# Patient Record
Sex: MALE | Race: WHITE | Employment: FULL TIME | URBAN - METROPOLITAN AREA
[De-identification: names, ages, dates, MRNs, and addresses within clinical notes are randomized per-mention and may not be internally consistent; named-entity substitution may affect disease eponyms.]

---

## 2017-03-24 ENCOUNTER — OFFICE VISIT (OUTPATIENT)
Dept: FAMILY MEDICINE CLINIC | Age: 42
End: 2017-03-24

## 2017-03-24 VITALS
SYSTOLIC BLOOD PRESSURE: 136 MMHG | HEART RATE: 64 BPM | TEMPERATURE: 98 F | WEIGHT: 195.6 LBS | BODY MASS INDEX: 22.63 KG/M2 | RESPIRATION RATE: 16 BRPM | DIASTOLIC BLOOD PRESSURE: 84 MMHG | HEIGHT: 78 IN

## 2017-03-24 DIAGNOSIS — M54.2 NECK PAIN, CHRONIC: ICD-10-CM

## 2017-03-24 DIAGNOSIS — J06.9 VIRAL URI: Primary | ICD-10-CM

## 2017-03-24 DIAGNOSIS — G89.29 NECK PAIN, CHRONIC: ICD-10-CM

## 2017-03-24 PROCEDURE — 99213 OFFICE O/P EST LOW 20 MIN: CPT | Performed by: INTERNAL MEDICINE

## 2017-03-24 ASSESSMENT — ENCOUNTER SYMPTOMS
GASTROINTESTINAL NEGATIVE: 1
RESPIRATORY NEGATIVE: 1
ALLERGIC/IMMUNOLOGIC NEGATIVE: 1

## 2018-03-09 ENCOUNTER — HOSPITAL ENCOUNTER (OUTPATIENT)
Dept: GENERAL RADIOLOGY | Age: 43
Discharge: HOME | End: 2018-03-09

## 2018-03-09 ENCOUNTER — OFFICE VISIT (OUTPATIENT)
Dept: FAMILY MEDICINE CLINIC | Age: 43
End: 2018-03-09

## 2018-03-09 ENCOUNTER — HOSPITAL ENCOUNTER (OUTPATIENT)
Dept: OTHER | Age: 43
Discharge: HOME | End: 2018-03-10
Attending: INTERNAL MEDICINE | Admitting: INTERNAL MEDICINE

## 2018-03-09 ENCOUNTER — HOSPITAL ENCOUNTER (OUTPATIENT)
Dept: GENERAL RADIOLOGY | Age: 43
Discharge: HOME | End: 2018-03-10

## 2018-03-09 VITALS
HEIGHT: 78 IN | DIASTOLIC BLOOD PRESSURE: 64 MMHG | HEART RATE: 55 BPM | RESPIRATION RATE: 18 BRPM | OXYGEN SATURATION: 96 % | WEIGHT: 198 LBS | BODY MASS INDEX: 22.91 KG/M2 | SYSTOLIC BLOOD PRESSURE: 118 MMHG

## 2018-03-09 DIAGNOSIS — M25.551 RIGHT HIP PAIN: ICD-10-CM

## 2018-03-09 DIAGNOSIS — M25.551 RIGHT HIP PAIN: Primary | ICD-10-CM

## 2018-03-09 DIAGNOSIS — G89.29 CHRONIC PAIN OF RIGHT KNEE: ICD-10-CM

## 2018-03-09 DIAGNOSIS — M25.561 CHRONIC PAIN OF RIGHT KNEE: ICD-10-CM

## 2018-03-09 PROCEDURE — 99213 OFFICE O/P EST LOW 20 MIN: CPT | Performed by: INTERNAL MEDICINE

## 2018-03-09 ASSESSMENT — ENCOUNTER SYMPTOMS
ALLERGIC/IMMUNOLOGIC NEGATIVE: 1
RESPIRATORY NEGATIVE: 1
GASTROINTESTINAL NEGATIVE: 1

## 2018-03-09 ASSESSMENT — PATIENT HEALTH QUESTIONNAIRE - PHQ9
SUM OF ALL RESPONSES TO PHQ9 QUESTIONS 1 & 2: 0
SUM OF ALL RESPONSES TO PHQ QUESTIONS 1-9: 0
1. LITTLE INTEREST OR PLEASURE IN DOING THINGS: 0
2. FEELING DOWN, DEPRESSED OR HOPELESS: 0

## 2018-03-09 NOTE — PROGRESS NOTES
Subjective:      Patient ID: Alex Leung is a 37 y.o. male. HPI  Chronic pain of right knee  Chronic R knee pain. Worse in am and after sleeping for a while. Sharp and decreased ROM while has pain. Right hip pain  Worse after sleeping on side. No palp Trochanter tendonitis. Past Medical History:   Diagnosis Date    Chronic back pain     djd     Current Outpatient Prescriptions   Medication Sig Dispense Refill    Glucosamine-Chondroitin 500-400 MG CAPS Take  by mouth.  multivitamin (THERAGRAN) per tablet Take 1 tablet by mouth daily.  Cholecalciferol (D3-1000) 1000 UNITS CAPS Take  by mouth. No current facility-administered medications for this visit. No Known Allergies  Social History   Substance Use Topics    Smoking status: Never Smoker    Smokeless tobacco: Never Used    Alcohol use Yes      Comment: occasional     Family History   Problem Relation Age of Onset    Stroke Maternal Grandmother     High Cholesterol Father     High Cholesterol Paternal Grandmother     Cancer Mother      skin       Review of Systems   Constitutional: Negative. Respiratory: Negative. Cardiovascular: Negative. Gastrointestinal: Negative. Genitourinary: Negative. Musculoskeletal: Negative. Allergic/Immunologic: Negative. Neurological: Negative. Hematological: Negative. Psychiatric/Behavioral: Negative. Vitals:    03/09/18 1452   BP: 118/64   Pulse: 55   Resp: 18   SpO2: 96%   Weight: 198 lb (89.8 kg)   Height: 6' 6\" (1.981 m)       Objective:   Physical Exam   Constitutional: He is oriented to person, place, and time. Vital signs are normal. He appears well-developed and well-nourished. No distress. HENT:   Head: Normocephalic and atraumatic. Eyes: Conjunctivae and EOM are normal. Pupils are equal, round, and reactive to light. Neck: Trachea normal, normal range of motion, full passive range of motion without pain and phonation normal. Neck supple.  Normal

## 2018-03-09 NOTE — ASSESSMENT & PLAN NOTE
Chronic R knee pain. Worse in am and after sleeping for a while. Sharp and decreased ROM while has pain.

## 2018-03-12 ENCOUNTER — TELEPHONE (OUTPATIENT)
Dept: FAMILY MEDICINE CLINIC | Age: 43
End: 2018-03-12

## 2018-03-12 NOTE — TELEPHONE ENCOUNTER
FYI- The patient called to check on the results of his hip Xray from 3/9/18. I read over the results with him and gave him the number for [facility]. He will contact them to set something up. He had no other questions and understood those results.

## 2018-03-28 ENCOUNTER — OFFICE VISIT (OUTPATIENT)
Dept: ORTHOPEDIC SURGERY | Age: 43
End: 2018-03-28

## 2018-03-28 VITALS
BODY MASS INDEX: 22.91 KG/M2 | SYSTOLIC BLOOD PRESSURE: 109 MMHG | HEIGHT: 78 IN | DIASTOLIC BLOOD PRESSURE: 72 MMHG | HEART RATE: 80 BPM | WEIGHT: 197.97 LBS

## 2018-03-28 DIAGNOSIS — M25.851 RIGHT HIP IMPINGEMENT SYNDROME: ICD-10-CM

## 2018-03-28 DIAGNOSIS — M25.561 PAIN IN JOINT OF RIGHT KNEE: Primary | ICD-10-CM

## 2018-03-28 DIAGNOSIS — M17.11 PRIMARY OSTEOARTHRITIS OF RIGHT KNEE: ICD-10-CM

## 2018-03-28 PROCEDURE — 99203 OFFICE O/P NEW LOW 30 MIN: CPT | Performed by: ORTHOPAEDIC SURGERY

## 2018-03-28 RX ORDER — DICLOFENAC SODIUM 75 MG/1
75 TABLET, DELAYED RELEASE ORAL 2 TIMES DAILY
Qty: 60 TABLET | Refills: 1 | Status: SHIPPED | OUTPATIENT
Start: 2018-03-28 | End: 2018-07-23 | Stop reason: ALTCHOICE

## 2018-03-28 NOTE — PROGRESS NOTES
lateral joint lines are nontender, no tenderness about the patella, full range of motion and ligamentously stable      Diagnostic Testing:      Views:  2   Location:  RIGHT knee   Findings:  Mild medial and patellofemoral joint line narrowing, early calcinosis of the medial compartment joint space. I also reviewed previous x-rays of the knee and hip. Unfortunately the patient has cam impingement of the RIGHT hip with degenerative changes. Arthritis is moderate. Orders     Orders Placed This Encounter   Procedures    XR KNEE RIGHT (1-2 VIEWS)     [id number]     Order Specific Question:   Reason for exam:     Answer:   Pain         Assessment / Treatment Plan:     1. RIGHT hip osteoarthritis with cam impingement. He also has moderate osteoarthritic change already in the hip with diminished range of motion. I think he is passed any sort of arthroscopic procedure. 2.  RIGHT knee pain, mild osteoarthritis, likely referred pain from the hip    I talked to the patient about the nature of this problem. Talked about treatment options including physical therapy, avoiding high-impact activity, glucosamine conjoined and anti-inflammatories. He understands he will likely ultimately require surgical fixation, likely total hip arthroplasty as his symptoms persist.  I do not think he is a candidate for any type of arthroscopic or resurfacing procedure. I have personally performed and/or participated in the history, exam and medical decision making and agree with all pertinent clinical information. I have also reviewed and agree with the past medical, family and social history unless otherwise noted. This dictation was performed with a verbal recognition program (DRAGON) and it was checked for errors. It is possible that there are still dictated errors within this office note. If so, please bring any errors to my attention for an addendum.  All efforts were made to ensure that this office note is

## 2018-07-23 ENCOUNTER — OFFICE VISIT (OUTPATIENT)
Dept: DERMATOLOGY | Age: 43
End: 2018-07-23

## 2018-07-23 DIAGNOSIS — D18.01 CHERRY ANGIOMA: ICD-10-CM

## 2018-07-23 DIAGNOSIS — Z12.83 SCREENING EXAM FOR SKIN CANCER: ICD-10-CM

## 2018-07-23 DIAGNOSIS — D22.9 MULTIPLE BENIGN NEVI: Primary | ICD-10-CM

## 2018-07-23 PROCEDURE — 99203 OFFICE O/P NEW LOW 30 MIN: CPT | Performed by: DERMATOLOGY

## 2018-07-23 NOTE — PROGRESS NOTES
[facility]) Dermatology  Jun Steel [address]      Lisa Monroe  1975    37 y.o. male     Date of Visit: 7/23/2018    Chief Complaint / Reason for Referral: Skin check     I was asked to see this patient by Dr. Brice ref. provider found. History of Present Illness:  1. Here for skin check. Several yr history of persistent increasing in number asymptomatic lesions on trunk. No assoc pain or bleeding.   -Denies personal history of skin cancer, atypical nevi, or melanoma. -(+) family hx skin cancer - unsure of type     2. Here for mole check. No new moles. No moles changing in size, shape, color. None associated w/ pain, bleeding, pruritus.    -Likes to spend time outdoors. Uses SPF 50 sunscreen and swim shirts regularly.   -Just returned from annual trip to [address] [place] [address] of Systems:  Constitutional: Reports general sense of well-being. Heme: Denies abnormal bleeding/bruising. Past Medical History, Surgical History, Family History, Medications and Allergies reviewed. Past Medical History:   Diagnosis Date    Chronic back pain     djd     Past Surgical History:   Procedure Laterality Date    KNEE SURGERY      right    SHOULDER SURGERY      right       No Known Allergies  Outpatient Prescriptions Marked as Taking for the 7/23/18 encounter (Office Visit) with Jun Steel MD   Medication Sig Dispense Refill    Glucosamine-Chondroitin 500-400 MG CAPS Take  by mouth.  multivitamin (THERAGRAN) per tablet Take 1 tablet by mouth daily.  Cholecalciferol (D3-1000) 1000 UNITS CAPS Take  by mouth. Social History: Wife and children are pts here     Physical Examination     [place] Skin Type 3    The following were examined and determined to be normal: Psych/Neuro, Scalp/hair, Head/face, Conjunctivae/eyelids, Gums/teeth/lips, Neck, RUE, LUE, RLE, LLE, Nails/digits and Genitalia/groin/buttocks.     The following were examined and determined to be abnormal:

## 2018-11-01 ENCOUNTER — OFFICE VISIT (OUTPATIENT)
Dept: FAMILY MEDICINE CLINIC | Age: 43
End: 2018-11-01
Payer: COMMERCIAL

## 2018-11-01 VITALS
BODY MASS INDEX: 23.51 KG/M2 | OXYGEN SATURATION: 99 % | SYSTOLIC BLOOD PRESSURE: 128 MMHG | HEIGHT: 78 IN | WEIGHT: 203.2 LBS | RESPIRATION RATE: 16 BRPM | DIASTOLIC BLOOD PRESSURE: 72 MMHG | HEART RATE: 86 BPM

## 2018-11-01 DIAGNOSIS — M25.551 RIGHT HIP PAIN: ICD-10-CM

## 2018-11-01 DIAGNOSIS — Z00.00 ANNUAL PHYSICAL EXAM: Primary | ICD-10-CM

## 2018-11-01 PROCEDURE — 99396 PREV VISIT EST AGE 40-64: CPT | Performed by: INTERNAL MEDICINE

## 2018-11-01 ASSESSMENT — ENCOUNTER SYMPTOMS
ALLERGIC/IMMUNOLOGIC NEGATIVE: 1
RESPIRATORY NEGATIVE: 1
GASTROINTESTINAL NEGATIVE: 1
EYES NEGATIVE: 1

## 2018-11-01 NOTE — PATIENT INSTRUCTIONS
All above problems reviewed and the found to be unchanged except for the following :     Annual Physical Exam. Claritin,Neti pot and Flonase for allergies and 1% Hydrocortisone cream and Selsun Blue for Seborrhea . Right Hip Pain. Exercises and Aleve as needed. To Ortho if worsens. Prostate: na  Colonoscopy: na  DEXA: na  Eye: 8/2018. Hearing: passed in office exam. Maybe some worse. Immunization: up to date.   MMSE: na  Get Up and Go: na  Tob: nonsmoker/never  ETOH: 2-3 drinks/week  Caffeine: moderate  Cardiac Risk Assessment: 1.4%  Living will: yes  Medical power of : yes

## 2019-06-20 ENCOUNTER — TELEPHONE (OUTPATIENT)
Dept: FAMILY MEDICINE CLINIC | Age: 44
End: 2019-06-20

## 2019-06-20 DIAGNOSIS — R79.89 ABNORMAL CBC: Primary | ICD-10-CM

## 2019-06-20 NOTE — TELEPHONE ENCOUNTER
Patient said he called on Monday and left a message for Dr. Nicola Valdez regarding appointment verses blood work only. I do not see an encounter. He was last seen in November last year for his physical to return in a year according to office notes. His daughter was diagnosed with Leukemia. He had a blood draw done and his white blood count was low. He would like to know if you would like to see him and do a CBC in the office, do blood work with a lab appointment only?

## 2019-06-21 ENCOUNTER — NURSE ONLY (OUTPATIENT)
Dept: FAMILY MEDICINE CLINIC | Age: 44
End: 2019-06-21
Payer: COMMERCIAL

## 2019-06-21 DIAGNOSIS — R79.89 ABNORMAL CBC: ICD-10-CM

## 2019-06-21 LAB
HCT VFR BLD CALC: 43.6 % (ref 40.5–52.5)
HEMOGLOBIN: 14.2 G/DL (ref 13.5–17.5)
MCH RBC QN AUTO: 29.1 PG (ref 26–34)
MCHC RBC AUTO-ENTMCNC: 32.7 G/DL (ref 31–36)
MCV RBC AUTO: 89.1 FL (ref 80–100)
PDW BLD-RTO: 13.7 % (ref 12.4–15.4)
PLATELET # BLD: 207 K/UL (ref 135–450)
PMV BLD AUTO: 9.5 FL (ref 5–10.5)
RBC # BLD: 4.89 M/UL (ref 4.2–5.9)
WBC # BLD: 5.6 K/UL (ref 4–11)

## 2019-06-21 PROCEDURE — 36415 COLL VENOUS BLD VENIPUNCTURE: CPT | Performed by: INTERNAL MEDICINE

## 2020-04-13 ENCOUNTER — TELEPHONE (OUTPATIENT)
Dept: FAMILY MEDICINE CLINIC | Age: 45
End: 2020-04-13

## 2020-04-13 ENCOUNTER — VIRTUAL VISIT (OUTPATIENT)
Dept: FAMILY MEDICINE CLINIC | Age: 45
End: 2020-04-13
Payer: COMMERCIAL

## 2020-04-13 VITALS — WEIGHT: 190 LBS | DIASTOLIC BLOOD PRESSURE: 70 MMHG | BODY MASS INDEX: 21.96 KG/M2 | SYSTOLIC BLOOD PRESSURE: 130 MMHG

## 2020-04-13 PROBLEM — F51.02 ADJUSTMENT INSOMNIA: Status: ACTIVE | Noted: 2020-04-13

## 2020-04-13 PROCEDURE — 99212 OFFICE O/P EST SF 10 MIN: CPT | Performed by: INTERNAL MEDICINE

## 2020-04-13 RX ORDER — ZOLPIDEM TARTRATE 10 MG/1
10 TABLET ORAL NIGHTLY PRN
Qty: 14 TABLET | Refills: 0 | Status: SHIPPED | OUTPATIENT
Start: 2020-04-13 | End: 2020-04-27

## 2020-04-13 ASSESSMENT — ENCOUNTER SYMPTOMS
RESPIRATORY NEGATIVE: 1
ALLERGIC/IMMUNOLOGIC NEGATIVE: 1
GASTROINTESTINAL NEGATIVE: 1

## 2020-04-13 ASSESSMENT — PATIENT HEALTH QUESTIONNAIRE - PHQ9
SUM OF ALL RESPONSES TO PHQ QUESTIONS 1-9: 2
SUM OF ALL RESPONSES TO PHQ QUESTIONS 1-9: 2
2. FEELING DOWN, DEPRESSED OR HOPELESS: 1
SUM OF ALL RESPONSES TO PHQ9 QUESTIONS 1 & 2: 2
1. LITTLE INTEREST OR PLEASURE IN DOING THINGS: 1

## 2020-04-13 NOTE — PROGRESS NOTES
formulation 10/08/2015, 10/18/2018    Influenza Virus Vaccine 10/18/2013, 10/09/2014    Influenza Whole 10/18/2013, 10/09/2014    Influenza, Quadv, IM, PF (6 mo and older Fluzone, Flulaval, Fluarix, and 3 yrs and older Afluria) 10/14/2019    Tdap (Boostrix, Adacel) 07/30/2012   ,   Health Maintenance   Topic Date Due    HIV screen  01/02/1990    Lipid screen  07/10/2017    DTaP/Tdap/Td vaccine (2 - Td) 07/30/2022    Flu vaccine  Completed    Hepatitis A vaccine  Aged Out    Hepatitis B vaccine  Aged Out    Hib vaccine  Aged Out    Meningococcal (ACWY) vaccine  Aged Out    Pneumococcal 0-64 years Vaccine  Aged Out       PHYSICAL EXAMINATION:  [ INSTRUCTIONS:  \"[x]\" Indicates a positive item  \"[]\" Indicates a negative item  -- DELETE ALL ITEMS NOT EXAMINED]  Vital Signs: (As obtained by patient/caregiver or practitioner observation)     04/13/20 1455   BP: 130/70   Weight: 190 lb (86.2 kg)          Constitutional: [x] Appears well-developed and well-nourished [x] No apparent distress      [] Abnormal-   Mental status  [x] Alert and awake  [x] Oriented to person/place/time [x]Able to follow commands      Eyes:  EOM    [x]  Normal  [] Abnormal-  Sclera  [x]  Normal  [] Abnormal -         Discharge [x]  None visible  [] Abnormal -    HENT:   [x] Normocephalic, atraumatic.   [] Abnormal   [x] Mouth/Throat: Mucous membranes are moist.     External Ears [x] Normal  [] Abnormal-     Neck: [x] No visualized mass     Pulmonary/Chest: [x] Respiratory effort normal.  [x] No visualized signs of difficulty breathing or respiratory distress        [] Abnormal-      Musculoskeletal:   [x] Normal gait with no signs of ataxia         [x] Normal range of motion of neck        [] Abnormal-       Neurological:        [x] No Facial Asymmetry (Cranial nerve 7 motor function) (limited exam to video visit)          [x] No gaze palsy        [] Abnormal-         Skin:        [x] No significant exanthematous lesions or discoloration noted on facial skin         [] Abnormal-            Psychiatric:       [x] Normal Affect [x] No Hallucinations        [] Abnormal-     Other pertinent observable physical exam findings-     ASSESSMENT/PLAN:  1. Adjustment insomnia  Father passed away 3 days ago and not sleeping    - zolpidem (AMBIEN) 10 MG tablet; Take 1 tablet by mouth nightly as needed for Sleep for up to 14 days. Dispense: 14 tablet; Refill: 0    Follow up as needed. Beth Granados is a 39 y.o. male being evaluated by a Virtual Visit (video visit) encounter to address concerns as mentioned above. A caregiver was present when appropriate. Due to this being a TeleHealth encounter (During OWIKT-51 public health emergency), evaluation of the following organ systems was limited: Vitals/Constitutional/EENT/Resp/CV/GI//MS/Neuro/Skin/Heme-Lymph-Imm. Pursuant to the emergency declaration under the [address] [place] [address] and the [employer] Act, this Virtual Visit was conducted with patient's (and/or legal guardian's) consent, to reduce the patient's risk of exposure to COVID-19 and provide necessary medical care. The patient (and/or legal guardian) has also been advised to contact this office for worsening conditions or problems, and seek emergency medical treatment and/or call 911 if deemed necessary. Services were provided through a video synchronous discussion virtually to substitute for in-person clinic visit. Patient and provider were located at their individual homes. --Cecil Armstrong MD on 4/13/2020 at 3:10 PM    An electronic signature was used to authenticate this note.

## 2020-04-13 NOTE — ASSESSMENT & PLAN NOTE
Father passed away 3 days ago. Not sleeping well. Just wants something short term for sleep. No depression. No anxiety. 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

## 2020-10-05 ENCOUNTER — OFFICE VISIT (OUTPATIENT)
Dept: FAMILY MEDICINE CLINIC | Age: 45
End: 2020-10-05
Payer: COMMERCIAL

## 2020-10-05 VITALS
BODY MASS INDEX: 22.52 KG/M2 | DIASTOLIC BLOOD PRESSURE: 70 MMHG | OXYGEN SATURATION: 98 % | WEIGHT: 194.6 LBS | HEART RATE: 58 BPM | SYSTOLIC BLOOD PRESSURE: 110 MMHG | HEIGHT: 78 IN | TEMPERATURE: 97.7 F | RESPIRATION RATE: 16 BRPM

## 2020-10-05 PROBLEM — S69.92XA INJURY OF FINGER OF LEFT HAND: Status: ACTIVE | Noted: 2020-10-05

## 2020-10-05 PROBLEM — M72.2 PLANTAR FASCIITIS: Status: ACTIVE | Noted: 2020-10-05

## 2020-10-05 PROBLEM — E78.00 PURE HYPERCHOLESTEROLEMIA: Status: ACTIVE | Noted: 2020-10-05

## 2020-10-05 LAB
ALBUMIN SERPL-MCNC: 4.6 G/DL (ref 3.4–5)
ALP BLD-CCNC: 56 U/L (ref 40–129)
ALT SERPL-CCNC: 27 U/L (ref 10–40)
ANION GAP SERPL CALCULATED.3IONS-SCNC: 12 MMOL/L (ref 3–16)
AST SERPL-CCNC: 26 U/L (ref 15–37)
BILIRUB SERPL-MCNC: 0.5 MG/DL (ref 0–1)
BILIRUBIN DIRECT: <0.2 MG/DL (ref 0–0.3)
BILIRUBIN, INDIRECT: NORMAL MG/DL (ref 0–1)
BUN BLDV-MCNC: 12 MG/DL (ref 7–20)
CALCIUM SERPL-MCNC: 9.9 MG/DL (ref 8.3–10.6)
CHLORIDE BLD-SCNC: 102 MMOL/L (ref 99–110)
CHOLESTEROL, TOTAL: 187 MG/DL (ref 0–199)
CO2: 28 MMOL/L (ref 21–32)
CREAT SERPL-MCNC: 1 MG/DL (ref 0.9–1.3)
GFR AFRICAN AMERICAN: >60
GFR NON-AFRICAN AMERICAN: >60
GLUCOSE BLD-MCNC: 89 MG/DL (ref 70–99)
HCT VFR BLD CALC: 44.6 % (ref 40.5–52.5)
HDLC SERPL-MCNC: 65 MG/DL (ref 40–60)
HEMOGLOBIN: 14.6 G/DL (ref 13.5–17.5)
LDL CHOLESTEROL CALCULATED: 108 MG/DL
MCH RBC QN AUTO: 29 PG (ref 26–34)
MCHC RBC AUTO-ENTMCNC: 32.8 G/DL (ref 31–36)
MCV RBC AUTO: 88.4 FL (ref 80–100)
PDW BLD-RTO: 13.1 % (ref 12.4–15.4)
PHOSPHORUS: 3.5 MG/DL (ref 2.5–4.9)
PLATELET # BLD: 196 K/UL (ref 135–450)
PMV BLD AUTO: 8.8 FL (ref 5–10.5)
POTASSIUM SERPL-SCNC: 4.6 MMOL/L (ref 3.5–5.1)
RBC # BLD: 5.04 M/UL (ref 4.2–5.9)
SODIUM BLD-SCNC: 142 MMOL/L (ref 136–145)
TOTAL PROTEIN: 7 G/DL (ref 6.4–8.2)
TRIGL SERPL-MCNC: 70 MG/DL (ref 0–150)
TSH SERPL DL<=0.05 MIU/L-ACNC: 0.98 UIU/ML (ref 0.27–4.2)
VLDLC SERPL CALC-MCNC: 14 MG/DL
WBC # BLD: 3.7 K/UL (ref 4–11)

## 2020-10-05 PROCEDURE — 99396 PREV VISIT EST AGE 40-64: CPT | Performed by: INTERNAL MEDICINE

## 2020-10-05 PROCEDURE — 36415 COLL VENOUS BLD VENIPUNCTURE: CPT | Performed by: INTERNAL MEDICINE

## 2020-10-05 ASSESSMENT — ENCOUNTER SYMPTOMS
RESPIRATORY NEGATIVE: 1
EYES NEGATIVE: 1
GASTROINTESTINAL NEGATIVE: 1
ALLERGIC/IMMUNOLOGIC NEGATIVE: 1

## 2020-10-05 NOTE — ASSESSMENT & PLAN NOTE
Prostate: na  Colonoscopy: na  DEXA: na  Eye: 9/2020  Hearing: passed in office exam. Maybe some worse. Immunization: Flu shot at work next week.   MMSE: na  Get Up and Go: na  Tob: nonsmoker/never  ETOH: 2-3 drinks/week  Caffeine: moderate  Cardiac Risk Assessment: 1.4%  Living will: yes  Medical power of : yes

## 2020-10-05 NOTE — PATIENT INSTRUCTIONS
ASSESSMENT/PLAN:  1. Annual physical exam  Flu shot next week at work. 2. Pure hypercholesterolemia  Will do labs. 3. Injury of finger of left hand, subsequent encounter  F/u w/ Dr Soto Drake. 4. Right hip pain  To Ortho if new c/o.     5. Adjustment insomnia  meds as needed, call if new c/o.     6. Plantar fasciitis  Ice and stretch, inserts. 7. Hearing Loss  Will do hearing test.     RTSM 1 yr for H&P    An  electronic signature was used to authenticate this note. --Kristin Wilson MD on 10/5/2020 at 8:34 AM          Prostate: na  Colonoscopy: na  DEXA: na  Eye: 9/2020  Hearing: passed in office exam. Maybe some worse. Immunization: Flu shot at work next week. MMSE: na  Get Up and Go: na  Tob: nonsmoker/never  ETOH: 2-3 drinks/week  Caffeine: moderate  Cardiac Risk Assessment: 1.4%  Living will: yes  Medical power of : yes    Patient Education        Plantar Fasciitis: Exercises  Introduction  Here are some examples of exercises for you to try. The exercises may be suggested for a condition or for rehabilitation. Start each exercise slowly. Ease off the exercises if you start to have pain. You will be told when to start these exercises and which ones will work best for you. How to do the exercises  Towel stretch   1. Sit with your legs extended and knees straight. 2. Place a towel around your foot just under the toes. 3. Hold each end of the towel in each hand, with your hands above your knees. 4. Pull back with the towel so that your foot stretches toward you. 5. Hold the position for at least 15 to 30 seconds. 6. Repeat 2 to 4 times a session, up to 5 sessions a day. Calf stretch   This exercise stretches the muscles at the back of the lower leg (the calf) and the Achilles tendon. Do this exercise 3 or 4 times a day, 5 days a week. 1. Stand facing a wall with your hands on the wall at about eye level. Put the leg you want to stretch about a step behind your other leg.   2. Keeping your back heel on the floor, bend your front knee until you feel a stretch in the back leg. 3. Hold the stretch for 15 to 30 seconds. Repeat 2 to 4 times. Plantar fascia and calf stretch   Stretching the plantar fascia and calf muscles can increase flexibility and decrease heel pain. You can do this exercise several times each day and before and after activity. 1. Stand on a step as shown above. Be sure to hold on to the banister. 2. Slowly let your heels down over the edge of the step as you relax your calf muscles. You should feel a gentle stretch across the bottom of your foot and up the back of your leg to your knee. 3. Hold the stretch about 15 to 30 seconds, and then tighten your calf muscle a little to bring your heel back up to the level of the step. Repeat 2 to 4 times. Towel curls   Make this exercise more challenging by placing a weighted object, such as a soup can, on the other end of the towel. 1. While sitting, place your foot on a towel on the floor and scrunch the towel toward you with your toes. 2. Then, also using your toes, push the towel away from you. [place] pickups   1. Put marbles on the floor next to a cup.  2. Using your toes, try to lift the marbles up from the floor and put them in the cup. Follow-up care is a key part of your treatment and safety. Be sure to make and go to all appointments, and call your doctor if you are having problems. It's also a good idea to know your test results and keep a list of the medicines you take. Where can you learn more? Go to https://PellePharmdemarDibbz.Trupanion. org and sign in to your Mila account. Enter B792 in the [facility] box to learn more about \"Plantar Fasciitis: Exercises. \"     If you do not have an account, please click on the \"Sign Up Now\" link. Current as of: March 2, 2020               Content Version: 12.5  © 1571-3077 Healthwise, Incorporated. Care instructions adapted under license by [facility] it. Cut off the top of the cup until a half-inch of ice shows. Hold onto the remaining paper to use the cup. Rub the ice in small circles over the area for 5 to 7 minutes. · Contrast baths, which alternate hot and cold water, can also help reduce swelling. But because heat alone may make pain and swelling worse, end a contrast bath with a soak in cold water. · Wear a night splint if your doctor suggests it. A night splint holds your foot with the toes pointed up and the foot and ankle at a 90-degree angle. This position gives the bottom of your foot a constant, gentle stretch. · Do simple exercises such as calf stretches and towel stretches 2 to 3 times each day, especially when you first get up in the morning. These can help the plantar fascia become more flexible. They also make the muscles that support your arch stronger. Hold these stretches for 15 to 30 seconds per stretch. Repeat 2 to 4 times. ? Stand about 1 foot from a wall. Place the palms of both hands against the wall at chest level. Lean forward against the wall, keeping one leg with the knee straight and heel on the ground while bending the knee of the other leg.  ? Sit down on the floor or a mat with your feet stretched in front of you. Roll up a towel lengthwise, and loop it over the ball of your foot. Holding the towel at both ends, gently pull the towel toward you to stretch your foot. · Wear shoes with good arch support. Athletic shoes or shoes with a well-cushioned sole are good choices. · Try heel cups or shoe inserts (orthotics) to help cushion your heel. You can buy these at many shoe stores. · Put on your shoes as soon as you get out of bed. Going barefoot or wearing slippers may make your pain worse. · Reach and stay at a good weight for your height. This puts less strain on your feet. When should you call for help?    Call your doctor now or seek immediate medical care if:  · You have heel pain with fever, redness, or warmth in your heel.  · You cannot put weight on the sore foot. Watch closely for changes in your health, and be sure to contact your doctor if:  · You have numbness or tingling in your heel. · Your heel pain lasts more than 2 weeks. Where can you learn more? Go to https://chpepiceweb.Pict. org and sign in to your Therasis account. Enter S699 in the [employer] box to learn more about \"Plantar Fasciitis: Care Instructions. \"     If you do not have an account, please click on the \"Sign Up Now\" link. Current as of: March 2, 2020               Content Version: 12.5  © 6894-0725 Healthwise, Incorporated. Care instructions adapted under license by [facility] ([facility]). If you have questions about a medical condition or this instruction, always ask your healthcare professional. [address] any warranty or liability for your use of this information.

## 2020-10-05 NOTE — ASSESSMENT & PLAN NOTE
L ring finger. Had ring caught on boat, skin injury and injured tendon end of L ring finger. Seeing [place]([facility]). Still in splint for 2-3 more weeks. Healing well.

## 2020-10-05 NOTE — ASSESSMENT & PLAN NOTE
Worse after sleeping on side. No palp Trochanter tendonitis. Found to have some imepingment and takes meds if needed. Has seen Ortho.

## 2020-10-05 NOTE — PROGRESS NOTES
°C)    SpO2: 98%    Weight: 194 lb 9.6 oz (88.3 kg)    Height: 6' 6\" (1.981 m)      Estimated body mass index is 22.49 kg/m² as calculated from the following:    Height as of this encounter: 6' 6\" (1.981 m). Weight as of this encounter: 194 lb 9.6 oz (88.3 kg). Physical Exam  Constitutional:       General: He is not in acute distress. Appearance: Normal appearance. He is well-developed and normal weight. He is not ill-appearing, toxic-appearing or diaphoretic. HENT:      Head: Normocephalic and atraumatic. Right Ear: Tympanic membrane, ear canal and external ear normal. Decreased hearing noted. There is no impacted cerumen. Left Ear: Tympanic membrane, ear canal and external ear normal. Decreased hearing noted. There is no impacted cerumen. Nose: Nose normal. No congestion or rhinorrhea. Right Sinus: No maxillary sinus tenderness or frontal sinus tenderness. Left Sinus: No maxillary sinus tenderness or frontal sinus tenderness. Mouth/Throat:      Mouth: Mucous membranes are moist.      Pharynx: Oropharynx is clear. Uvula midline. No oropharyngeal exudate or posterior oropharyngeal erythema. Eyes:      General: Lids are normal. No scleral icterus. Right eye: No discharge. Left eye: No discharge. Extraocular Movements: Extraocular movements intact. Conjunctiva/sclera: Conjunctivae normal.      Pupils: Pupils are equal, round, and reactive to light. Funduscopic exam:     Right eye: No hemorrhage, exudate, AV nicking, arteriolar narrowing or papilledema. Left eye: No hemorrhage, exudate, AV nicking, arteriolar narrowing or papilledema. Neck:      Musculoskeletal: Full passive range of motion without pain, normal range of motion and neck supple. No neck rigidity or muscular tenderness. Thyroid: No thyroid mass or thyromegaly. Vascular: Normal carotid pulses. No carotid bruit, hepatojugular reflux or JVD.       Trachea: Trachea normal. No tracheal deviation. Cardiovascular:      Rate and Rhythm: Normal rate and regular rhythm. No extrasystoles are present. Chest Wall: PMI is not displaced. Pulses: Normal pulses. No decreased pulses. Carotid pulses are 2+ on the right side and 2+ on the left side. Radial pulses are 2+ on the right side and 2+ on the left side. Femoral pulses are 2+ on the right side and 2+ on the left side. Popliteal pulses are 2+ on the right side and 2+ on the left side. Dorsalis pedis pulses are 2+ on the right side and 2+ on the left side. Posterior tibial pulses are 2+ on the right side and 2+ on the left side. Heart sounds: Normal heart sounds. No murmur. No friction rub. No gallop. Pulmonary:      Effort: Pulmonary effort is normal. No tachypnea, bradypnea, accessory muscle usage or respiratory distress. Breath sounds: Normal breath sounds. No stridor. No decreased breath sounds, wheezing, rhonchi or rales. Chest:      Chest wall: No tenderness. Abdominal:      General: Bowel sounds are normal. There is no distension or abdominal bruit. Palpations: Abdomen is soft. There is no shifting dullness, fluid wave, mass or pulsatile mass. Tenderness: There is no abdominal tenderness. There is no right CVA tenderness, left CVA tenderness, guarding or rebound. Hernia: No hernia is present. There is no hernia in the ventral area or left inguinal area. Genitourinary:     Penis: Normal. No tenderness. Scrotum/Testes: Normal. Cremasteric reflex is present. Prostate: Normal.      Rectum: Normal. Guaiac result negative. Musculoskeletal: Normal range of motion. General: Tenderness (PAin R hip and R lateral Epicondylitis. ) and signs of injury (L Ring finger ) present. No swelling or deformity. Right lower leg: No edema. Left lower leg: No edema.    Lymphadenopathy:      Head:      Right side of head: No submental, submandibular, tonsillar, preauricular, posterior auricular or occipital adenopathy. Left side of head: No submental, submandibular, tonsillar, preauricular, posterior auricular or occipital adenopathy. Cervical: No cervical adenopathy. Upper Body:      Right upper body: No supraclavicular or epitrochlear adenopathy. Left upper body: No supraclavicular or epitrochlear adenopathy. Skin:     General: Skin is warm and dry. Capillary Refill: Capillary refill takes less than 2 seconds. Coloration: Skin is not jaundiced or pale. Findings: No abrasion, bruising, erythema, lesion or rash. Nails: There is no clubbing. Neurological:      General: No focal deficit present. Mental Status: He is alert and oriented to person, place, and time. Mental status is at baseline. He is not disoriented. Cranial Nerves: No cranial nerve deficit. Sensory: No sensory deficit. Motor: No weakness, tremor, atrophy, abnormal muscle tone or seizure activity. Coordination: Coordination normal.      Gait: Gait normal.      Deep Tendon Reflexes: Reflexes are normal and symmetric. Reflexes normal. Babinski sign absent on the right side. Babinski sign absent on the left side. Reflex Scores:       Tricep reflexes are 2+ on the right side and 2+ on the left side. Bicep reflexes are 2+ on the right side and 2+ on the left side. Brachioradialis reflexes are 2+ on the right side and 2+ on the left side. Patellar reflexes are 2+ on the right side and 2+ on the left side. Achilles reflexes are 2+ on the right side and 2+ on the left side. Psychiatric:         Mood and Affect: Mood normal.         Speech: Speech normal.         Behavior: Behavior normal.         Thought Content: Thought content normal.         Judgment: Judgment normal.         ASSESSMENT/PLAN:  1. Annual physical exam  Flu shot next week at work.     2. Pure hypercholesterolemia  Will do labs.    3. Injury of finger of left hand, subsequent encounter  F/u w/ Dr Zackary Drew. 4. Right hip pain  To Ortho if new c/o.     5. Adjustment insomnia  meds as needed, call if new c/o.     6. Plantar fasciitis  Ice and stretch, inserts. 7. Hearing Loss  Will do hearing test.     RTSM 1 yr for H&P    An  electronic signature was used to authenticate this note.     --Raul Valenzuela MD on 10/5/2020 at 8:34 AM

## 2020-10-11 NOTE — PROGRESS NOTES
Chas Li   1975, 39 y.o. male   [id number]       Referring Provider: Rios Farmer MD   Referral Type: In an order in [address] [place] [address]    Reason for Visit: Evaluation of suspected change in hearing and tinnitus. ADULT AUDIOLOGIC EVALUATION      Chas Li is a 39 y.o. male seen today, 10/16/2020 , for an initial audiologic evaluation. AUDIOLOGIC AND OTHER PERTINENT MEDICAL HISTORY:      Chas Li noted a perceived gradual decline in hearing, bilaterally as he reports increased difficulty hearing details of conversation especially in background noise. Patient reports intermittent, non-bothersome tinnitus, bilaterally. He also notes family history of age-related hearing loss. No additional significant otologic or medical history was reported. Chas Li denied otalgia, aural fullness, otorrhea, dizziness, imbalance, history of falls, history of occupational/recreational noise exposure, history of head trauma and history of ear surgery. Date: 10/16/2020     IMPRESSIONS:      Today's results revealed hearing within normal limits and excellent word recognition, bilaterally. Discussed use of good communication strategies to assist with conversation. ASSESSMENT AND FINDINGS:     Otoscopy revealed: Clear ear canals bilaterally    RIGHT EAR:  Hearing Sensitivity:Within normal limits through 2kHz sloping to a mild to moderate sensorineural hearing loss through 8kHz. Speech Recognition Threshold: 15 dB HL  Word Recognition: Excellent (100%), based on NU-6 25-word list at 50 dBHL using recorded speech stimuli. Tympanometry: Normal peak pressure and compliance, Type A tympanogram, consistent with normal middle ear function. LEFT EAR:  Hearing Sensitivity: Within normal limits through 2kHz sloping to a mild to moderate sensorineural hearing loss through [facility]. Speech Recognition Threshold: 10 dB HL  Word Recognition: Excellent (96%), based on NU-6 25-word list at 50 dBHL using recorded speech stimuli. Tympanometry: Normal peak pressure and compliance, Type A tympanogram, consistent with normal middle ear function. Reliability: Good  Transducer: Inserts    See scanned audiogram dated 10/16/2020  for results. PATIENT EDUCATION:       The following items were discussed with the patient:    - Good Communication Strategies    - Tinnitus Management Strategies    Educational information was shared in the After Visit Summary. RECOMMENDATIONS:                                                                                                                                                                                                                                                          The following items are recommended based on patient report and results from today's appointment:   - Continue medical follow-up with Ana Moon MD .   - Retest hearing as medically indicated and/or sooner if a change in hearing is noted. - Utilize \"Good Communication Strategies\" as discussed to assist in speech understanding with communication partners. - Maintain a sound enriched environment to assist in the management of tinnitus symptoms.          Kristian Campos  Audiologist    Chart CC'd to: Ana Moon MD       Degree of   Hearing Sensitivity dB Range   Within Normal Limits (WNL) 0 - 20   Mild 20 - 40   Moderate 40 - 55   Moderately-Severe 55 - 70   Severe 70 - 90   Profound 90 +

## 2020-10-16 ENCOUNTER — PROCEDURE VISIT (OUTPATIENT)
Dept: AUDIOLOGY | Age: 45
End: 2020-10-16
Payer: COMMERCIAL

## 2020-10-16 VITALS — TEMPERATURE: 97 F

## 2020-10-16 PROBLEM — H90.3 SENSORINEURAL HEARING LOSS, BILATERAL: Status: ACTIVE | Noted: 2020-10-16

## 2020-10-16 PROCEDURE — 92567 TYMPANOMETRY: CPT | Performed by: AUDIOLOGIST

## 2020-10-16 PROCEDURE — 92557 COMPREHENSIVE HEARING TEST: CPT | Performed by: AUDIOLOGIST

## 2020-10-16 NOTE — Clinical Note
Dr. Jarred Kan,    Thank you for your referral for audiometric testing on this patient. Today's results revealed hearing within normal limits and excellent word recognition, bilaterally. Discussed use of good communication strategies to assist with conversation. Please see the scanned audiogram (under \"Media\" tab) and encounter note for details. If you have any questions, or if there is anything else you need, please let me know.       Kristian Thompson  Audiologist  ---  [facility] - [facility] - Audiology

## 2020-10-16 NOTE — PATIENT INSTRUCTIONS
Good Communication Strategies    Communication can be challenging for anyone, but can be especially difficult for those with some degree of hearing loss. While we may not be able to control every factor that may lead to difficulty with communication, there are Good Communication Strategies that we can all use in our day-to-day lives. Communication takes both parties working together for it to be successful. Tips as a Listener:   1. Control your environment. It is important to limit the amount of background noise in the room when possible. You should also consider having a good light source in the room to best see the other person. 2. Ask for clarification. Instead of saying \"What?\", you can use parts of what you heard to make a new question. For example, if you heard the word \"Thursday\" but not the rest of the week, you may ask \"What was that about Thursday? \" or \"What did you want to do Thursday? \". This shows the person talking that you are listening and will help them better explain what they are saying. 3. Be an advocate for yourself. If you are hearing but not understanding, tell the other person \"I can hear you, but I need you to slow down when you speak. \"  Or if someone is facing the other direction, say \"I cannot hear you when you are not looking at me when we talk. \"       Tips as a Talker:   - Sit or stand 3 to 6 feet away to maximize audibility         -- It is unrealistic to believe someone else will fully hear your message if you are speaking from across the room or in a different room in the house   - Stay at eye level to help with visual cues   - Make sure you have the persons attention before speaking   - Use facial expressions and gestures to accentuate your message   - Raise your voice slightly (do not scream)   - Speak slowly and distinctly   - Use short, simple sentences   - Rephrase your words if the person is having a hard time understanding you    - To avoid distortion, dont speak directly into a persons ear      Some additional items that may be helpful:   - Remain patient - this is important for both parties   - Write down items that still cannot be heard/understood. You may write with pen/paper or consider typing/texting on a cell phone or smart device. - If background noise is unavoidable, try to keep yourself in a good position in the room. By sitting at a renteria on the side of the restaurant (preferably a corner), it will be easier to communicate than if you were sitting at a table in the middle with background noise surrounding you. Keep yourself positioned away from music speakers or heavy foot traffic. Tinnitus: Overview and Management Strategies          Many people have some ringing sounds in their ears once in a while. You may hear a roar, a hiss, a tinkle, or a buzz. The sound usually lasts only a few minutes. If it goes on all the time, you may have tinnitus. Tinnitus is usually caused by long-term exposure to loud noise. This damages the nerves in the inner ear. It can occur with all types of hearing loss. It may be a symptom of almost any ear problem. Tinnitus may be caused by a buildup of earwax. Or, it may be caused by ear infections or certain medicines (especially antibiotics or large amounts of aspirin). You can also hear noises in your ears because of an injury to the ears, drinking too much alcohol or caffeine, or a medical condition. Other conditions may also contribute to tinnitus, including: head and neck trauma, temporomandibular joint disorder (TMJ), sinus pressure and barometric trauma, traumatic brain injury, metabolic disorders, autoimmune disorders, stress, and high blood pressure. You may need tests to evaluate your hearing and to find causes of long-lasting tinnitus. Your doctor may suggest one or more treatments to help you cope with the tinnitus. You can also do things at home to help reduce symptoms.     Follow-up care is a key part of your treatment and safety. Be sure to make and go to all appointments, and call your doctor if you are having problems. It's also a good idea to know your test results and keep a list of the medicines you take. How can you care for yourself at home? · Limit or cut out alcohol, caffeine, and sodium. They can make your symptoms worse. · Do not smoke or use other tobacco products. Nicotine reduces blood flow to the ear and makes tinnitus worse. If you need help quitting, talk to your doctor about stop-smoking programs and medicines. These can increase your chances of quitting for good. · Talk to your doctor about whether to stop taking aspirin and similar products such as ibuprofen or naproxen. · Get exercise often. It can help improve blood flow to the ear. Ways to manage/cope with tinnitus  Some tinnitus may last a long time. To manage your tinnitus, try to:  · Avoid noises that you think caused your tinnitus. If you can't avoid loud noises, wear earplugs or earmuffs. · Ignore the sound by paying attention to other things. Keeping your brain busy with other tasks or background noise can help your brain not focus on the tinnitus. · Try to not give the tinnitus an emotional reaction. Do your best to ignore the sound and not let it bother you. Relax using biofeedback, meditation, or yoga. Feeling stressed and being tired can make tinnitus worse. · Play music or white noise to help you sleep. Background noise may cover up the noise that you hear in your ears. You can buy a tabletop machine or a device that sits under your pillow to play soothing sounds, like ocean waves. · Smart phones have free apps, such as Whist, Relax Melodies, ReSound Relief, and White Noise Lite. These apps have different types of sounds/noise, some of which you can blend together to find sounds that are most soothing to you.   · Hearing aid technology, especially when there is some hearing loss, may help reduce tinnitus symptoms by giving your brain better access to the sounds it is missing. There are some hearing aids with built-in noise generator programs, which may help when amplification alone is not enough. Additional resources may be found through the [employer] at www.elvira.org    When should you call for help? Call 911 anytime you think you may need emergency care. For example, call if:    · You have symptoms of a stroke. These may include:  ? Sudden numbness, tingling, weakness, or loss of movement in your face, arm, or leg, especially on only one side of your body. ? Sudden vision changes. ? Sudden trouble speaking. ? Sudden confusion or trouble understanding simple statements. ? Sudden problems with walking or balance. ? A sudden, severe headache that is different from past headaches. Call your doctor now or seek immediate medical care if:    · You develop other symptoms. These may include hearing loss (or worse hearing loss), balance problems, dizziness, nausea, or vomiting. Watch closely for changes in your health, and be sure to contact your doctor if:    · Your tinnitus moves from both ears to one ear. · Your hearing loss gets worse within 1 day after an ear injury. · Your tinnitus or hearing loss does not get better within 1 week after an ear injury. · Your tinnitus bothers you enough that you want to take medicines to help you cope with it. If you notice changes in your tinnitus and/or your hearing, it is recommended that you have your hearing tested by your audiologist and to follow-up with your physician that manages your hearing loss (such as your ENT or Primary Care doctor).

## 2020-11-12 ENCOUNTER — TELEPHONE (OUTPATIENT)
Dept: FAMILY MEDICINE CLINIC | Age: 45
End: 2020-11-12

## 2020-11-12 NOTE — TELEPHONE ENCOUNTER
Patient calling to let Dr Jeferson Sanford know he got his flu shot at work on 10- at work so it could be put in my chart

## 2021-10-07 ENCOUNTER — OFFICE VISIT (OUTPATIENT)
Dept: FAMILY MEDICINE CLINIC | Age: 46
End: 2021-10-07
Payer: COMMERCIAL

## 2021-10-07 VITALS
WEIGHT: 194.6 LBS | HEIGHT: 78 IN | OXYGEN SATURATION: 98 % | RESPIRATION RATE: 16 BRPM | DIASTOLIC BLOOD PRESSURE: 74 MMHG | SYSTOLIC BLOOD PRESSURE: 118 MMHG | HEART RATE: 61 BPM | BODY MASS INDEX: 22.52 KG/M2

## 2021-10-07 DIAGNOSIS — M25.551 RIGHT HIP PAIN: ICD-10-CM

## 2021-10-07 DIAGNOSIS — S69.92XS INJURY OF FINGER OF LEFT HAND, SEQUELA: ICD-10-CM

## 2021-10-07 DIAGNOSIS — Z00.00 ANNUAL PHYSICAL EXAM: Primary | ICD-10-CM

## 2021-10-07 DIAGNOSIS — F51.02 ADJUSTMENT INSOMNIA: ICD-10-CM

## 2021-10-07 DIAGNOSIS — R53.83 FATIGUE, UNSPECIFIED TYPE: ICD-10-CM

## 2021-10-07 DIAGNOSIS — M72.2 PLANTAR FASCIITIS: ICD-10-CM

## 2021-10-07 DIAGNOSIS — Z11.4 SCREENING FOR HIV WITHOUT PRESENCE OF RISK FACTORS: ICD-10-CM

## 2021-10-07 DIAGNOSIS — Z11.59 NEED FOR HEPATITIS C SCREENING TEST: ICD-10-CM

## 2021-10-07 DIAGNOSIS — G89.29 NECK PAIN, CHRONIC: ICD-10-CM

## 2021-10-07 DIAGNOSIS — M54.2 NECK PAIN, CHRONIC: ICD-10-CM

## 2021-10-07 DIAGNOSIS — Z12.11 SCREEN FOR COLON CANCER: ICD-10-CM

## 2021-10-07 DIAGNOSIS — E78.00 PURE HYPERCHOLESTEROLEMIA: ICD-10-CM

## 2021-10-07 PROBLEM — J06.9 VIRAL URI: Status: RESOLVED | Noted: 2017-03-24 | Resolved: 2021-10-07

## 2021-10-07 LAB
ALBUMIN SERPL-MCNC: 4.6 G/DL (ref 3.4–5)
ALP BLD-CCNC: 64 U/L (ref 40–129)
ALT SERPL-CCNC: 24 U/L (ref 10–40)
ANION GAP SERPL CALCULATED.3IONS-SCNC: 13 MMOL/L (ref 3–16)
AST SERPL-CCNC: 23 U/L (ref 15–37)
BILIRUB SERPL-MCNC: 0.5 MG/DL (ref 0–1)
BILIRUBIN DIRECT: <0.2 MG/DL (ref 0–0.3)
BILIRUBIN, INDIRECT: NORMAL MG/DL (ref 0–1)
BUN BLDV-MCNC: 18 MG/DL (ref 7–20)
CALCIUM SERPL-MCNC: 9.7 MG/DL (ref 8.3–10.6)
CHLORIDE BLD-SCNC: 103 MMOL/L (ref 99–110)
CHOLESTEROL, TOTAL: 187 MG/DL (ref 0–199)
CO2: 26 MMOL/L (ref 21–32)
CREAT SERPL-MCNC: 1 MG/DL (ref 0.9–1.3)
GFR AFRICAN AMERICAN: >60
GFR NON-AFRICAN AMERICAN: >60
GLUCOSE BLD-MCNC: 80 MG/DL (ref 70–99)
HCT VFR BLD CALC: 45.5 % (ref 40.5–52.5)
HDLC SERPL-MCNC: 58 MG/DL (ref 40–60)
HEMOGLOBIN: 14.8 G/DL (ref 13.5–17.5)
HEPATITIS C ANTIBODY INTERPRETATION: NORMAL
LDL CHOLESTEROL CALCULATED: 120 MG/DL
MCH RBC QN AUTO: 28.5 PG (ref 26–34)
MCHC RBC AUTO-ENTMCNC: 32.5 G/DL (ref 31–36)
MCV RBC AUTO: 87.8 FL (ref 80–100)
PDW BLD-RTO: 13.2 % (ref 12.4–15.4)
PHOSPHORUS: 2.7 MG/DL (ref 2.5–4.9)
PLATELET # BLD: 227 K/UL (ref 135–450)
PMV BLD AUTO: 9.3 FL (ref 5–10.5)
POTASSIUM SERPL-SCNC: 4.9 MMOL/L (ref 3.5–5.1)
RBC # BLD: 5.18 M/UL (ref 4.2–5.9)
SODIUM BLD-SCNC: 142 MMOL/L (ref 136–145)
TOTAL PROTEIN: 7.3 G/DL (ref 6.4–8.2)
TRIGL SERPL-MCNC: 44 MG/DL (ref 0–150)
TSH SERPL DL<=0.05 MIU/L-ACNC: 0.84 UIU/ML (ref 0.27–4.2)
VLDLC SERPL CALC-MCNC: 9 MG/DL
WBC # BLD: 3.9 K/UL (ref 4–11)

## 2021-10-07 PROCEDURE — 99396 PREV VISIT EST AGE 40-64: CPT | Performed by: INTERNAL MEDICINE

## 2021-10-07 PROCEDURE — 36415 COLL VENOUS BLD VENIPUNCTURE: CPT | Performed by: INTERNAL MEDICINE

## 2021-10-07 ASSESSMENT — PATIENT HEALTH QUESTIONNAIRE - PHQ9
SUM OF ALL RESPONSES TO PHQ QUESTIONS 1-9: 0
2. FEELING DOWN, DEPRESSED OR HOPELESS: 0
SUM OF ALL RESPONSES TO PHQ QUESTIONS 1-9: 0
SUM OF ALL RESPONSES TO PHQ9 QUESTIONS 1 & 2: 0
1. LITTLE INTEREST OR PLEASURE IN DOING THINGS: 0
SUM OF ALL RESPONSES TO PHQ QUESTIONS 1-9: 0

## 2021-10-07 ASSESSMENT — ENCOUNTER SYMPTOMS
RESPIRATORY NEGATIVE: 1
GASTROINTESTINAL NEGATIVE: 1
EYES NEGATIVE: 1
ALLERGIC/IMMUNOLOGIC NEGATIVE: 1

## 2021-10-07 NOTE — PATIENT INSTRUCTIONS
A/P:     Annual Physical Exam. Colonoscopy soon. Pure Hypercholesterolemia. Will do labs and call if further w/u or treatment required. Injury of Finger of Left Hand. F/u w/ [facility]And MD if new c/o. Plantar Fasciitis. Ice and stretch, to Foot MD if no improvement. Adjustment Insomnia. Much improved. No meds required. Right Hip Pain. Chronic, sees ORtho. To ORtho if new c/o. Neck Pain, Chronic. Stretch and ice. Call if new c/o. Shanon Og MD         Prostate: na  Colonoscopy: na  DEXA: na  Eye: 9/2021  Hearing: passed in office exam. Recent hearing test showed minimal change. Immunization: up to date. MMSE: na  Get Up and Go: na  Tob: nonsmoker/never  ETOH: 2-3 drinks/week  Caffeine: moderate  Cardiac Risk Assessment: labs pending.   Living will: yes  Medical power of : yes

## 2021-10-07 NOTE — ASSESSMENT & PLAN NOTE
Worse after sleeping on side. No palp Trochanter tendonitis. Found to have some imepingment and takes meds if needed. Has seen Ortho. OA and may need THR in ~10 yrs per Ortho.

## 2021-10-07 NOTE — ASSESSMENT & PLAN NOTE
Prostate: na  Colonoscopy: na  DEXA: na  Eye: 9/2021  Hearing: passed in office exam. Recent hearing test showed minimal change. Immunization: up to date. MMSE: na  Get Up and Go: na  Tob: nonsmoker/never  ETOH: 2-3 drinks/week  Caffeine: moderate  Cardiac Risk Assessment: labs pending.   Living will: yes  Medical power of : yes

## 2021-10-07 NOTE — ASSESSMENT & PLAN NOTE
1 yr of pain bilaterally. Walking around home and got worse. Using ice. Some better. Saw Foot MD. Has had a cortisone injection that helped.

## 2021-10-07 NOTE — ASSESSMENT & PLAN NOTE
L ring finger. Had ring caught on boat, skin injury and injured tendon end of L ring finger. Seeing Counce(Foad). Was in splint for 2-3 weeks. Healing well.

## 2021-10-07 NOTE — PROGRESS NOTES
Talisha Bunn presents today for   Chief Complaint   Patient presents with    Annual Exam        Annual physical exam  Prostate: na  Colonoscopy: na  DEXA: na  Eye: 9/2021  Hearing: passed in office exam. Recent hearing test showed minimal change. Immunization: up to date. MMSE: na  Get Up and Go: na  Tob: nonsmoker/never  ETOH: 2-3 drinks/week  Caffeine: moderate  Cardiac Risk Assessment: labs pending. Living will: yes  Medical power of : yes    Pure hypercholesterolemia  Elevated LDL in past.     Right hip pain  Worse after sleeping on side. No palp Trochanter tendonitis. Found to have some imepingment and takes meds if needed. Has seen Ortho. OA and may need THR in ~10 yrs per Ortho. Neck pain, chronic   Chronic mild, stretch. Plantar fasciitis   1 yr of pain bilaterally. Walking around home and got worse. Using ice. Some better. Saw Foot MD. Has had a cortisone injection that helped. Injury of finger of left hand  L ring finger. Had ring caught on boat, skin injury and injured tendon end of L ring finger. Seeing [place]). Was in splint for 2-3 weeks. Healing well. Adjustment insomnia  Sleeping better, takes meds rarely. Less stress at present. Review of Systems   Constitutional: Positive for fatigue. HENT: Negative. Eyes: Negative. Respiratory: Negative. Cardiovascular: Negative. Gastrointestinal: Negative. Endocrine: Negative. Genitourinary: Negative. Musculoskeletal: Positive for arthralgias, gait problem and myalgias. Skin: Negative. Allergic/Immunologic: Negative. Hematological: Negative. Psychiatric/Behavioral: Negative. Physical Exam  Constitutional:       General: He is not in acute distress. Appearance: Normal appearance. He is well-developed and normal weight. He is not ill-appearing, toxic-appearing or diaphoretic. HENT:      Head: Normocephalic and atraumatic.       Right Ear: Hearing, tympanic membrane, ear canal and external ear normal.      Left Ear: Hearing, tympanic membrane, ear canal and external ear normal.      Nose: Nose normal.      Right Sinus: No maxillary sinus tenderness or frontal sinus tenderness. Left Sinus: No maxillary sinus tenderness or frontal sinus tenderness. Mouth/Throat:      Pharynx: Uvula midline. No oropharyngeal exudate. Eyes:      General: Lids are normal. No scleral icterus. Right eye: No discharge. Left eye: No discharge. Conjunctiva/sclera: Conjunctivae normal.      Pupils: Pupils are equal, round, and reactive to light. Funduscopic exam:     Right eye: No hemorrhage, exudate, AV nicking, arteriolar narrowing or papilledema. Left eye: No hemorrhage, exudate, AV nicking, arteriolar narrowing or papilledema. Neck:      Thyroid: No thyroid mass or thyromegaly. Vascular: Normal carotid pulses. No carotid bruit, hepatojugular reflux or JVD. Trachea: Trachea normal. No tracheal deviation. Cardiovascular:      Rate and Rhythm: Normal rate and regular rhythm. No extrasystoles are present. Chest Wall: PMI is not displaced. Pulses: Normal pulses. No decreased pulses. Carotid pulses are 2+ on the right side and 2+ on the left side. Radial pulses are 2+ on the right side and 2+ on the left side. Femoral pulses are 2+ on the right side and 2+ on the left side. Popliteal pulses are 2+ on the right side and 2+ on the left side. Dorsalis pedis pulses are 2+ on the right side and 2+ on the left side. Posterior tibial pulses are 2+ on the right side and 2+ on the left side. Heart sounds: Normal heart sounds. No murmur heard. No friction rub. No gallop. Pulmonary:      Effort: Pulmonary effort is normal. No tachypnea, bradypnea, accessory muscle usage or respiratory distress. Breath sounds: Normal breath sounds. No stridor. No decreased breath sounds, wheezing, rhonchi or rales.    Chest: Chest wall: No tenderness. Abdominal:      General: Bowel sounds are normal. There is no distension or abdominal bruit. Palpations: Abdomen is soft. There is no shifting dullness, fluid wave, mass or pulsatile mass. Tenderness: There is no abdominal tenderness. There is no guarding or rebound. Hernia: No hernia is present. There is no hernia in the ventral area or left inguinal area. Genitourinary:     Penis: Normal. No tenderness. Testes: Normal. Cremasteric reflex is present. Musculoskeletal:         General: No swelling, tenderness, deformity or signs of injury. Normal range of motion. Cervical back: Full passive range of motion without pain, normal range of motion and neck supple. No rigidity or tenderness. Right lower leg: No edema. Left lower leg: No edema. Comments: S/p R shoulder surgery. OA R hip. Palp Plantar pain bilat. Lymphadenopathy:      Head:      Right side of head: No submental, submandibular, tonsillar, preauricular, posterior auricular or occipital adenopathy. Left side of head: No submental, submandibular, tonsillar, preauricular, posterior auricular or occipital adenopathy. Cervical: No cervical adenopathy. Upper Body:      Right upper body: No supraclavicular or epitrochlear adenopathy. Left upper body: No supraclavicular or epitrochlear adenopathy. Skin:     General: Skin is warm and dry. Coloration: Skin is not jaundiced or pale. Findings: No abrasion, bruising, erythema, lesion or rash. Nails: There is no clubbing. Neurological:      General: No focal deficit present. Mental Status: He is alert and oriented to person, place, and time. Mental status is at baseline. He is not disoriented. Cranial Nerves: No cranial nerve deficit. Sensory: No sensory deficit. Motor: No weakness, tremor, atrophy, abnormal muscle tone or seizure activity.       Coordination: Coordination normal. Gait: Gait normal.      Deep Tendon Reflexes: Reflexes are normal and symmetric. Reflexes normal. Babinski sign absent on the right side. Babinski sign absent on the left side. Reflex Scores:       Tricep reflexes are 2+ on the right side and 2+ on the left side. Bicep reflexes are 2+ on the right side and 2+ on the left side. Brachioradialis reflexes are 2+ on the right side and 2+ on the left side. Patellar reflexes are 2+ on the right side and 2+ on the left side. Achilles reflexes are 2+ on the right side and 2+ on the left side. Psychiatric:         Mood and Affect: Mood normal.         Speech: Speech normal.         Behavior: Behavior normal.         Thought Content: Thought content normal.         Judgment: Judgment normal.          A/P:     Annual Physical Exam. Colonoscopy soon. Pure Hypercholesterolemia. Will do labs and call if further w/u or treatment required. Injury of Finger of Left Hand. F/u w/ [facility] MD if new c/o. Plantar Fasciitis. Ice and stretch, to Foot MD if no improvement. Adjustment Insomnia. Much improved. No meds required. Right Hip Pain. Chronic, sees ORtho. To ORtho if new c/o. Neck Pain, Chronic. Stretch and ice. Call if new c/o.         Noble Palacios MD

## 2021-10-08 LAB
HIV AG/AB: NORMAL
HIV ANTIGEN: NORMAL
HIV-1 ANTIBODY: NORMAL
HIV-2 AB: NORMAL

## 2021-10-29 ENCOUNTER — TELEMEDICINE (OUTPATIENT)
Dept: PRIMARY CARE CLINIC | Age: 46
End: 2021-10-29
Payer: COMMERCIAL

## 2021-10-29 DIAGNOSIS — M72.2 PLANTAR FASCIITIS: Primary | ICD-10-CM

## 2021-10-29 DIAGNOSIS — M25.551 RIGHT HIP PAIN: ICD-10-CM

## 2021-10-29 DIAGNOSIS — F51.02 ADJUSTMENT INSOMNIA: ICD-10-CM

## 2021-10-29 PROCEDURE — 99203 OFFICE O/P NEW LOW 30 MIN: CPT | Performed by: FAMILY MEDICINE

## 2021-10-29 ASSESSMENT — ENCOUNTER SYMPTOMS
SHORTNESS OF BREATH: 0
DIARRHEA: 0
CONSTIPATION: 0
ABDOMINAL PAIN: 0

## 2021-10-29 NOTE — PROGRESS NOTES
[place], [place] [facility] [address]        Phone: [phone]                                 Skye Smith, was evaluated through a synchronous (real-time) audio-video encounter. The patient (or guardian if applicable) is aware that this is a billable service. Verbal consent to proceed has been obtained within the past 12 months. The visit was conducted pursuant to the emergency declaration under the [facility][note 3]1 [facility], [address] [place] [address] authority and the [employer] Act. Patient identification was verified, and a caregiver was present when appropriate. The patient was located in a state where the provider was credentialed to provide care. Total time spent for this encounter: Not billed by time    --Americo Gallagher DO on 10/29/2021 at 7:55 AM    An electronic signature was used to authenticate this note. Name:  Skye Smith  :    1975      Consultants:   Patient Care Team:  Kitty Hernandez DO as PCP - General (Family Medicine)    Chief Complaint:     Skye Smith is a 55 y.o. male  who presents today for a New Patient care visit with Personalized Prevention Plan Services as noted below. HPI:     51-year-old seen for virtual appointment to establish care. Patient was recently seen for his annual physical but his current PCP is retiring. Patient wanted to establish with us at our practice in case he needed anything moving forward. Patient continues to have ongoing symptoms related to his plantar fasciitis. Patient states he had shots last Thursday and states that his left is feeling better but his right continues to bother him.   Patient is also considering going to an orthotics store to be fitted for custom orthotics to help.    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 Patient did follow with orthopedics. Patient states that his pain is much better than it was when he saw them and he has decreased his running. Patient does play softball and other things and states that his pain is only episodic at this point. Patient also has a history of insomnia. Patient states during the time he was struggling there were a lot of situational things going on including cancer in his daughter and father. Patient states that he has come through on the other side now and is not having any issues or problems with sleep. Patient is also due for colonoscopy and he plans to contact the office in the January or February timeframe to have the procedure performed. Patient Active Problem List   Diagnosis    Annual physical exam    Neck pain, chronic    Right hip pain    Chronic pain of right knee    Adjustment insomnia    Pure hypercholesterolemia    Injury of finger of left hand    Plantar fasciitis    Sensorineural hearing loss, bilateral         Past Medical History:    Past Medical History:   Diagnosis Date    Chronic back pain     djd       Past Surgical History:  Past Surgical History:   Procedure Laterality Date    KNEE SURGERY      right    SHOULDER SURGERY      right       Home Meds:  Prior to Visit Medications    Medication Sig Taking? Authorizing Provider   Glucosamine-Chondroitin 500-400 MG CAPS Take  by mouth. Yes Historical Provider, MD   multivitamin [facility]) per tablet Take 1 tablet by mouth daily. Yes Historical Provider, MD   Cholecalciferol (D3-1000) 1000 UNITS CAPS Take  by mouth. Yes Historical Provider, MD       Allergies:    Patient has no known allergies.     Family History:       Problem Relation Age of Onset    Cancer Mother         skin    High Cholesterol Father     Stroke Maternal Grandmother     Stroke Maternal Grandfather         skin-unsure of type    High Cholesterol Paternal Grandmother        Social History:  Social History     Tobacco History     Smoking Status  Never Smoker    Smokeless Tobacco Use  Never Used          Alcohol History     Alcohol Use Status  Yes Comment  occasional          Drug Use     Drug Use Status  No          Sexual Activity     Sexually Active  Not Asked                   Health Maintenance Completed:  Health Maintenance   Topic Date Due    Colon cancer screen colonoscopy  Never done    Lipid screen  10/07/2026    DTaP/Tdap/Td vaccine (3 - Td or Tdap) 07/22/2030    Flu vaccine  Completed    COVID-19 Vaccine  Completed    Hepatitis C screen  Completed    HIV screen  Completed    Hepatitis A vaccine  Aged Out    Hepatitis B vaccine  Aged Out    Hib vaccine  Aged Out    Meningococcal (ACWY) vaccine  Aged Out    Pneumococcal 0-64 years Vaccine  Aged [employer] History   Administered Date(s) Administered    COVID-19, Pfizer, PF, 30mcg/0.3mL 02/10/2021, 03/03/2021    Influenza Vaccine, unspecified formulation 10/08/2015, 10/18/2018    Influenza Virus Vaccine 10/18/2013, 10/09/2014, 10/08/2015, 10/13/2020, 10/04/2021    Influenza Whole 10/18/2013, 10/09/2014    Influenza, Quadv, IM, PF (6 mo and older Fluzone, Flulaval, Fluarix, and 3 yrs and older Afluria) 10/14/2019    Td (Adult), 2 Lf Tetanus Toxoid, Pf (Td, Absorbed) 07/22/2020    Tdap (Boostrix, Adacel) 07/30/2012         Review of Systems:  Review of Systems   Constitutional: Negative for fatigue and fever. Respiratory: Negative for shortness of breath. Cardiovascular: Negative for chest pain and leg swelling. Gastrointestinal: Negative for abdominal pain, constipation and diarrhea. Musculoskeletal:        See hpi   Skin: Negative for rash. Neurological: Negative for headaches. Physical Exam:   There were no vitals filed for this visit. There is no height or weight on file to calculate BMI.     Wt Readings from Last 3 Encounters: 10/07/21 194 lb 9.6 oz (88.3 kg)   10/05/20 194 lb 9.6 oz (88.3 kg)   04/13/20 190 lb (86.2 kg)       BP Readings from Last 3 Encounters:   10/07/21 118/74   10/05/20 110/70   04/13/20 130/70       Physical Exam  Constitutional:       Appearance: Normal appearance. He is normal weight. HENT:      Head: Normocephalic and atraumatic. Pulmonary:      Effort: Pulmonary effort is normal.   Neurological:      General: No focal deficit present. Mental Status: He is alert and oriented to person, place, and time. Psychiatric:         Mood and Affect: Mood normal.         Behavior: Behavior normal.         Thought Content:  Thought content normal.         Judgment: Judgment normal.              Lab Review:   Office Visit on 10/07/2021   Component Date Value    WBC 10/07/2021 3.9*    RBC 10/07/2021 5.18     Hemoglobin 10/07/2021 14.8     Hematocrit 10/07/2021 45.5     MCV 10/07/2021 87.8     MCH 10/07/2021 28.5     MCHC 10/07/2021 32.5     RDW 10/07/2021 13.2     Platelets 22/43/0329 227     MPV 10/07/2021 9.3     Total Protein 10/07/2021 7.3     Albumin 10/07/2021 4.6     Alkaline Phosphatase 10/07/2021 64     ALT 10/07/2021 24     AST 10/07/2021 23     Total Bilirubin 10/07/2021 0.5     Bilirubin, Direct 10/07/2021 <0.2     Bilirubin, Indirect 10/07/2021 see below     Sodium 10/07/2021 142     Potassium 10/07/2021 4.9     Chloride 10/07/2021 103     CO2 10/07/2021 26     Anion Gap 10/07/2021 13     Glucose 10/07/2021 80     BUN 10/07/2021 18     CREATININE 10/07/2021 1.0     GFR Non- 10/07/2021 >60     GFR  10/07/2021 >60     Calcium 10/07/2021 9.7     Phosphorus 10/07/2021 2.7     TSH 10/07/2021 0.84     Cholesterol, Total 10/07/2021 187     Triglycerides 10/07/2021 44     HDL 10/07/2021 58     LDL Calculated 10/07/2021 120*    VLDL Cholesterol Calcula* 10/07/2021 9     HIV Ag/Ab 10/07/2021 Non-Reactive     HIV-1 Antibody 10/07/2021 Non-Reactive     HIV ANTIGEN 10/07/2021 Non-Reactive     HIV-2 Ab 10/07/2021 Non-Reactive     Hep C Ab Interp 10/07/2021 Non-reactive           Assessment/Plan:  Eric cruz was seen today for establish care. Diagnoses and all orders for this visit:    Plantar fasciitis    Right hip pain    Adjustment insomnia    70-year-old male with    1. Plantar fasciitis. Patient will continue to follow and receive care per specialist.  Patient plans to obtain orthotics for continued treatment. 2.  Right hip pain with cam deformity, osteoarthritis. Patient will continue to to modify his exercise regimen. Patient will follow up with orthopedics as needed. 3.  Situational insomnia. Resolved. 4.  Health maintenance. Patient plans colonoscopy in January February 2022. Health Maintenance Due:  Health Maintenance Due   Topic Date Due    Colon cancer screen colonoscopy  Never done        Health care decision maker:  <72years old      Health Maintenance: (USPSTF Recommendations)  (F) Breast Cancer Screen: (40-49 (C), 50-74 biennial screening mammogram (B))  (F) Cervical Cancer Screen: (21-29 q3yr cytology alone; 30-65 q3yr cytology alone, q5yr with hrHPV alone, or q5yr cytology+hrHPV (A))  (M) Prostate Cancer Screen: (54-77 yo discuss benefits/harm, does not recommend testing PSA in men >73 yo (D):   (M) AAA Screen: (men 73-69 yo who has ever smoked (B), consider in nonsmokers if high risk):  CRC/Colonoscopy Screening: (adults 39-53 (B), 50-75 (A))  Lung Ca Screening: Annual LDCT (+smoker age 49-80, smoked within 15 years, total of 20 pack yr history (B)):  DEXA Screen: (women >65 and older, <65 if at risk/postmenopausal (B))  HIV Screen: (16-65 yr old, and all pregnant patients (A)): Hep C Screen: (18-79 yr old (B)):  HCC Screen: (all pts with cirrhosis and high risk Hep B (US q6 mo)):  Immunizations:    RTC:  Return in about 1 year (around 10/29/2022) for Well Visit.      EMR Dragon/transcription disclaimer:  Much of this encounter note is electronic transcription/translation of spoken language to printed texts. The electronic translation of spoken language may be erroneous, or at times, nonsensical words or phrases may be inadvertently transcribed.   Although I have reviewed the note for such errors, some may still exist.

## 2022-02-10 ENCOUNTER — VIRTUAL VISIT (OUTPATIENT)
Dept: PRIMARY CARE CLINIC | Age: 47
End: 2022-02-10
Payer: COMMERCIAL

## 2022-02-10 ENCOUNTER — TELEPHONE (OUTPATIENT)
Dept: PRIMARY CARE CLINIC | Age: 47
End: 2022-02-10

## 2022-02-10 DIAGNOSIS — J00 ACUTE RHINITIS: ICD-10-CM

## 2022-02-10 DIAGNOSIS — J06.9 ACUTE UPPER RESPIRATORY INFECTION: Primary | ICD-10-CM

## 2022-02-10 PROCEDURE — 99213 OFFICE O/P EST LOW 20 MIN: CPT | Performed by: STUDENT IN AN ORGANIZED HEALTH CARE EDUCATION/TRAINING PROGRAM

## 2022-02-10 RX ORDER — FLUTICASONE PROPIONATE 50 MCG
2 SPRAY, SUSPENSION (ML) NASAL DAILY
Qty: 48 G | Refills: 1 | Status: SHIPPED | OUTPATIENT
Start: 2022-02-10 | End: 2022-04-07

## 2022-02-10 SDOH — ECONOMIC STABILITY: FOOD INSECURITY: WITHIN THE PAST 12 MONTHS, THE FOOD YOU BOUGHT JUST DIDN'T LAST AND YOU DIDN'T HAVE MONEY TO GET MORE.: NEVER TRUE

## 2022-02-10 SDOH — ECONOMIC STABILITY: FOOD INSECURITY: WITHIN THE PAST 12 MONTHS, YOU WORRIED THAT YOUR FOOD WOULD RUN OUT BEFORE YOU GOT MONEY TO BUY MORE.: NEVER TRUE

## 2022-02-10 ASSESSMENT — ENCOUNTER SYMPTOMS
DIARRHEA: 0
CHEST TIGHTNESS: 0
VOMITING: 0
NAUSEA: 0
EYE PAIN: 0

## 2022-02-10 ASSESSMENT — SOCIAL DETERMINANTS OF HEALTH (SDOH): HOW HARD IS IT FOR YOU TO PAY FOR THE VERY BASICS LIKE FOOD, HOUSING, MEDICAL CARE, AND HEATING?: NOT HARD AT ALL

## 2022-02-10 NOTE — PROGRESS NOTES
[address] [facility], [address] [facility], [address] [facility] [address]        Phone: [phone]      Scott eCja, was evaluated through a synchronous (real-time) audio-video encounter. The patient (or guardian if applicable) is aware that this is a billable service, which includes applicable co-pays. This Virtual Visit was conducted with patient's (and/or legal guardian's) consent. The visit was conducted pursuant to the emergency declaration under the [facility], [address] [place] [address] authority and the [employer] Act. Patient identification was verified, and a caregiver was present when appropriate. The patient was located at home in a state where the provider was licensed to provide care. Total time spent for this encounter: Not billed by time    --Eddy Mcfarlane DO on 2/10/2022 at 2:18 PM    An electronic signature was used to authenticate this note. Name:  Scott Ceja  :    1975    Consultants:   Patient Care Team:  Musa Diggs DO as PCP - General (Family Medicine)  Musa Diggs DO as PCP - [facility] Empaneled Provider    Chief Complaint:     Scott Ceja is a 52 y.o. male  who presents today for an acute visit    HPI:     Scott Ceja 52 y.o. male presents for a virtual visit to discuss recent illness. Last Thursday he started to feel congested and developed a cough. He feels that the congestion has settled in his head. He has been using a neti pot, which has relieved much of the congestion. His congestion and cough have mostly resolved at this point but when he breathes his feels a stinging and burning pain in his nose. He does not feel that he needs a cough suppressant as he is not coughing very much and it is more of a tickle in his throat.  He has no hx of GERD. No heartburn symptoms. COVID PCR pending     No fever, chills, SOB. No nausea, vomiting, diarrhea. No notable reflux symptoms. Sinus pressure mostly resolved. 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 He has not been around any sick contacts that he knows of. Patient Active Problem List   Diagnosis    Neck pain, chronic    Right hip pain    Chronic pain of right knee    Adjustment insomnia    Pure hypercholesterolemia    Injury of finger of left hand    Plantar fasciitis    Sensorineural hearing loss, bilateral         Past Medical History:    Past Medical History:   Diagnosis Date    Chronic back pain     djd       Past Surgical History:  Past Surgical History:   Procedure Laterality Date    KNEE SURGERY      right    SHOULDER SURGERY      right       Home Meds:  Prior to Visit Medications    Medication Sig Taking? Authorizing Provider   Glucosamine-Chondroitin 500-400 MG CAPS Take  by mouth. Yes Historical Provider, MD   multivitamin [facility]) per tablet Take 1 tablet by mouth daily. Yes Historical Provider, MD   Cholecalciferol (D3-1000) 1000 UNITS CAPS Take  by mouth. Yes Historical Provider, MD       Allergies:    Patient has no known allergies.     Family History:       Problem Relation Age of Onset    Cancer Mother         skin    High Cholesterol Father     Stroke Maternal Grandmother     Stroke Maternal Grandfather         skin-unsure of type    High Cholesterol Paternal Grandmother          Health Maintenance Completed:  Health Maintenance   Topic Date Due    Colon cancer screen colonoscopy  Never done    Depression Screen  10/07/2022    Lipid screen  10/07/2026    DTaP/Tdap/Td vaccine (3 - Td or Tdap) 07/22/2030    Flu vaccine  Completed    COVID-19 Vaccine  Completed    Hepatitis C screen  Completed    HIV screen  Completed    Hepatitis A vaccine  Aged Out    Hepatitis B vaccine  Aged Out    Hib vaccine  Aged Out    Meningococcal (ACWY) vaccine  Aged Out    Pneumococcal 0-64 years Vaccine  Aged [employer] History   Administered Date(s) Administered    COVID-19, Pfizer Purple top, DILUTE for use, 12+ yrs, 30mcg/0.3mL dose 02/10/2021, 03/03/2021, 01/03/2022    Influenza Vaccine, unspecified formulation 10/08/2015, 10/18/2018    Influenza Virus Vaccine 10/18/2013, 10/09/2014, 10/08/2015, 10/13/2020, 10/04/2021    Influenza Whole 10/18/2013, 10/09/2014    Influenza, Quadv, IM, PF (6 mo and older Fluzone, Flulaval, Fluarix, and 3 yrs and older Afluria) 10/14/2019    Td (Adult), 2 Lf Tetanus Toxoid, Pf (Td, Absorbed) 07/22/2020    Tdap (Boostrix, Adacel) 07/30/2012         Review of Systems:  Review of Systems   Constitutional: Negative. HENT: Negative for ear pain. Eyes: Negative for pain. Respiratory: Negative for chest tightness. Cardiovascular: Negative for chest pain. Gastrointestinal: Negative for diarrhea, nausea and vomiting. Musculoskeletal: Negative for arthralgias and myalgias. Skin: Negative for rash and wound. Neurological: Negative for dizziness and headaches. Psychiatric/Behavioral: Negative for agitation and behavioral problems. Physical Exam:   There were no vitals filed for this visit. There is no height or weight on file to calculate BMI. Wt Readings from Last 3 Encounters:   10/07/21 194 lb 9.6 oz (88.3 kg)   10/05/20 194 lb 9.6 oz (88.3 kg)   04/13/20 190 lb (86.2 kg)       BP Readings from Last 3 Encounters:   10/07/21 118/74   10/05/20 110/70   04/13/20 130/70       Physical Exam  Constitutional:       Appearance: Normal appearance. HENT:      Head: Normocephalic and atraumatic. Comments: No pain with palpation of the maxillary or frontal sinuses. Patient instructed to palpate via video message. Eyes:      Extraocular Movements: Extraocular movements intact.       Conjunctiva/sclera: Conjunctivae normal.   Pulmonary:      Effort: Pulmonary effort is normal.   Neurological:      Mental Status: He is alert. Mental status is at baseline. Psychiatric:         Mood and Affect: Mood normal.         Behavior: Behavior normal.          Lab Review:   No visits with results within 2 Month(s) from this visit. Latest known visit with results is:   Office Visit on 10/07/2021   Component Date Value    WBC 10/07/2021 3.9*    RBC 10/07/2021 5.18     Hemoglobin 10/07/2021 14.8     Hematocrit 10/07/2021 45.5     MCV 10/07/2021 87.8     MCH 10/07/2021 28.5     MCHC 10/07/2021 32.5     RDW 10/07/2021 13.2     Platelets 28/61/2858 227     MPV 10/07/2021 9.3     Total Protein 10/07/2021 7.3     Albumin 10/07/2021 4.6     Alkaline Phosphatase 10/07/2021 64     ALT 10/07/2021 24     AST 10/07/2021 23     Total Bilirubin 10/07/2021 0.5     Bilirubin, Direct 10/07/2021 <0.2     Bilirubin, Indirect 10/07/2021 see below     Sodium 10/07/2021 142     Potassium 10/07/2021 4.9     Chloride 10/07/2021 103     CO2 10/07/2021 26     Anion Gap 10/07/2021 13     Glucose 10/07/2021 80     BUN 10/07/2021 18     CREATININE 10/07/2021 1.0     GFR Non- 10/07/2021 >60     GFR  10/07/2021 >60     Calcium 10/07/2021 9.7     Phosphorus 10/07/2021 2.7     TSH 10/07/2021 0.84     Cholesterol, Total 10/07/2021 187     Triglycerides 10/07/2021 44     HDL 10/07/2021 58     LDL Calculated 10/07/2021 120*    VLDL Cholesterol Calcula* 10/07/2021 9     HIV Ag/Ab 10/07/2021 Non-Reactive     HIV-1 Antibody 10/07/2021 Non-Reactive     HIV ANTIGEN 10/07/2021 Non-Reactive     HIV-2 Ab 10/07/2021 Non-Reactive     Hep C Ab Interp 10/07/2021 Non-reactive           Assessment/Plan:    Domi Becker 52 y.o. male was seen today for an acute visit due to recent sinus congestion and discomfort. Acute upper respiratory infection, improved  Patient describes symptoms consisted with a viral URI. Ddx. GERD, post-nasal drip.     Viral cough syndrome was discussed and that his cough may linger. Patient's symptoms do no warrant cough suppressant at this time.   -Follow up if symptoms do not improve    Acute rhinitis, acute   Patient does not describe symptoms of sinus infection and discomfort is likely from dry and inflamed nasal turbinates. - Flonase 2 sprays, 1 in each nostril 1-2 times a day   - Emollients such as Vaseline may also be used to soothe irritation  - If symptoms do into improve over the next 1-2 weeks call for follow up    COVID- PCR pending  Patient did not describe symptoms consistent with COVID. He is vaccinated and boosted so if he were to have COVID his symptoms may be very mild. Precautions such as masking or avoiding exposure until after the PCR resulted was discussed. Symptom onset 7 days ago. Health Maintenance Due:  Health Maintenance Due   Topic Date Due    Colon cancer screen colonoscopy  Never done          Health care decision maker:  <72years old      RTC:  Return if symptoms worsen or fail to improve. EMR Dragon/transcription disclaimer:  Much of this encounter note is electronic transcription/translation of spoken language to printed texts. The electronic translation of spoken language may be erroneous, or at times, nonsensical words or phrases may be inadvertently transcribed.   Although I have reviewed the note for such errors, some may still exist

## 2022-02-10 NOTE — TELEPHONE ENCOUNTER
----- Message from [facility] sent at 2/10/2022  7:51 AM EST -----  Subject: Message to Provider    QUESTIONS  Information for Provider? pt. needs appointment screen red system wants   him seen within two days no appointments sonia he feels its a sinus   infection   ---------------------------------------------------------------------------  --------------  CALL BACK INFO  What is the best way for the office to contact you? OK to leave message on   voicemail  Preferred Call Back Phone Number? [phone]  ---------------------------------------------------------------------------  --------------  SCRIPT ANSWERS  Relationship to Patient?  Self Chief Complaint  11Month old patient present with Mother for wellness exam      History of Present Illness  HPI: GIOVANA IS HERE WITH HIS PARENTS  THEY HAVE NO CONCERNS  , 4 months  Luke: The patient comes in today for routine health maintenance with his parent(s)  The last health maintenance visit was 1 months ago  General health since the last visit is described as good  Immunizations are needed  No sensory or development concerns are expressed  Current diet includes bottle feeding every 2 hours, bottle feeding 4 ounces / day and Enfamil AR  The patient does not use dietary supplements  No nutritional concerns are expressed  He has 8-10 wet diapers a day  He stools once a day  Stools are soft and sticky  No elimination concerns are expressed  He sleeps every 2-3 hours, for 2-3 hours at night and for 1 hours during the day  He sleeps in a crib on his back  The child's temperament is described as happy  Household risk factors:  no passive smoking exposure and no exposure to pets  Safety elements used:  car seat, smoke detectors and carbon monoxide detectors  Risk findings:  no tuberculosis  No lead poisoning risk factors has had no contact with any person having lead poisoning, has had no frequent exposure to buildings built before 1950, has not been exposed to a house build before 1978 with chipping/peaeing paint, or that had remodeling within 6 months, does not eat non-food items, has not has been exposed to bare soil or lead smelting area, has not been exposed to a person that works with lead and has had no exposure to unusual medicines/folk remedies  The patient's lead poisoning risk level is low  Childcare is provided in the child's home by a relative  Developmental Milestones  Developmental assessment is completed as part of a health care maintenance visit  Social - parent report:  smiling spontaneously, regarding own hand and recognizing familiar persons   Social - clinician observed:  smiling spontaneously  Gross motor - parent report:  rolling over  Gross motor-clinician observed:  lifting head up 45 degrees, lifting head up 90 degrees, rolling over and pushing chest up with arm support  Fine motor - parent report:  holding object in hand, putting object in mouth, picking up objects with one hand and passing a cube from hand to hand, but no taking a cube in each hand  Fine motor-clinician observed:  eyes following past midline, eyes following 180 degrees, putting hands together and reaching  Language - parent report:  "oohing/aahing", laughing, squealing, imitating speech sounds, uttering single syllables and jabbering  Language - clinician observed:  laughing  There was no screening tool used  Assessment Conclusion: development appears normal       Review of Systems    Constitutional: not acting fussy, no fever and normal PO intake of liquids or solids  Head and Face: normocephalic  Eyes: no purulent discharge from the eyes  ENT: MOM NOTICED A CANKER SORE AT THE BACK OF HIS TONGUE A FEW DAYS AGO- DISAPPEARED THE NEXT DAY, CHILD WAS ALWAYS EATING WELL AND NO FUSSINESS  and drooling was observed, but not pulling at ear, no discharge from the ears and no mouth sores  Cardiovascular: no diaphoresis with feeding  Respiratory: no cough, normal breathing rate and no nasal flaring  Gastrointestinal: no constipation and no vomiting  Genitourinary: circumcision area is not oozing  Active Problems    1  Encounter for immunization (V03 89) (Z23)   2   Infant born at 39 weeks gestation (765 10,765 28) (P07 39)    Past Medical History    · History of Abnormality on screening test (796 4) (R68 89)   · History of left inguinal hernia (V45 89) (Z51 334)   · History of  jaundice (V13 7) (Z87 898)   · History of [place] weight check, under 6days old (V20 31) (Z00 110)   · No pertinent past medical history    Surgical History    · History of Elective Circumcision   · History of Inguinal Hernia Repair    Family History  Mother    · Denied: Family history of substance abuse   · Denied: Family history of Mental health problem   · Family history of No chronic problems  Father    · Denied: Family history of substance abuse   · Denied: Family history of Mental health problem   · Family history of No chronic problems    Social History    · Denied: History of Dental care, regularly   · Never a smoker   · No tobacco/smoke exposure    Current Meds   1  No Reported Medications Recorded    Allergies    1  No Known Drug Allergies    Vitals  Signs    Height: 2 ft 0 25 in  Weight: 15 lb 3 oz  BMI Calculated: 18 15  BSA Calculated: 0 32  Premature Corrected Length Percentile: 7 %  Premature Corrected Weight Percentile: 34 %  Head Circumference: 41 5 cm  Premature Corrected Head Circumference Percentile: 33 %  Premature Corrected Head Circumference Percentile: 24 %    Physical Exam    Constitutional - General Appearance: Well appearing with no visible distress; no dysmorphic features  Head and Face - Head: Normocephalic, atraumatic  Examination of the fontanelles and sutures: Anterior fontanels open and flat  Eyes - Conjunctiva and lids: Conjunctiva noninjected, no eye discharge and no swelling  Pupils and irises: Equal, round, reactive to light and accommodation bilaterally; Extraocular muscles intact; Sclera anicteric  Ophthalmoscopic examination: Normal red reflex bilaterally  Ears, Nose, Mouth, and Throat - External inspection of ears and nose: Normal without deformities or discharge; No pinna or tragal tenderness  Otoscopic examination: Tympanic membrane is pearly gray and nonbulging without discharge  Nasal mucosa, septum, and turbinates: No nasal discharge, no edema, nares not pale or boggy  Oropharynx: Oropharynx without ulcer, exudate or erythema, moist mucous membranes  Neck - Neck: Supple  Pulmonary - Respiratory effort: No Stridor, no tachypnea, grunting, flaring, or retractions   Auscultation of lungs: Clear to auscultation bilaterally without wheeze, rales, or rhonchi  Cardiovascular - Auscultation of heart: Regular rate and rhythm, no murmur  Femoral pulses: 2+ bilaterally  Pedal pulses: 2+ pulses  Abdomen - Examination of the abdomen: Normal bowel sounds, soft, non-tender, no organomegaly  Liver and spleen: No hepatomegaly or splenomegaly  Genitourinary - Scrotal contents: Normal; testes descended bilaterally, no hydrocele  Examination of the penis: Normal without lesions  Harsha 1  Lymphatic - Palpation of lymph nodes in neck: No anterior or posterior cervical lymphadenopathy  Palpation of lymph nodes in axillae: No lymphadenopathy  Palpation of lymph nodes in groin: No lymphadenopathy  Musculoskeletal - Evaluation for scoliosis: No scoliosis on exam  Examination of joints, bones, and muscles: Negative Ortolani, negative Lerma, no joint swelling, and clavicles intact  Range of motion: Full range of motion in all extremities  Muscle strength/tone: Good strength  No hypertonia, no hypotonia  Skin - Skin and subcutaneous tissue: No rash, no bruising, no pallor, cyanosis, or icterus  Neurologic - Appropriate for age  Developmental milestones:  Assessment    1  Never a smoker   2  No tobacco/smoke exposure   3   Well child visit (V20 2) (Z00 129)    Plan     · Call [phone] if: You are concerned about your child's development ;  Status:Complete;   Done: 16LBP5494   Ordered;  For:Health Maintenance; Ordered By:Cheng Fiore;   · Call [phone] if: Your infant does not have at least 4 wet diapers a day ;  Status:Complete;   Done: 96ITY4000   Ordered;  For:Health Maintenance; Ordered By:Gwen Fiore;   · Seek Immediate Medical Attention if: Your baby is showing signs of dehydration ;  Status:Complete;   Done: 96DFY3613   Ordered;  For:Health Maintenance; Ordered By:Gwen Fiore;   · Seek Immediate Medical Attention if: Your child has a reaction to an immunization ;  Status:Active; Requested for:11Oct2017;    Ordered;  For:Health Maintenance; Ordered By:Gwen Fiore;   · Seek Immediate Medical Attention if: Your child has a reaction to the DTaP immunization ;  Status:Complete;   Done: 40TBR4999   Ordered;  For:Health Maintenance; Ordered By:Gwen Fiore;   · All medications can be dangerous or fatal to children ; Status:Complete;   Done:  11Oct2017   Ordered;  For:Health Maintenance; Ordered By:Gwen Fiore;   · Do not use aspirin for anyone under 25years of age ; Status:Complete;   Done:  12OGN2541   Ordered;  For:Health Maintenance; Ordered By:Gwen Fiore;   · General advice on breast-feeding ; Status:Complete;   Done: 11Oct2017   Ordered;  For:Health Maintenance; Ordered By:Gwen Fiore;   · Good hand washing is one of the best ways to control the spread of germs ;  Status:Complete;   Done: 02TTX9464   Ordered;  For:Health Maintenance; Ordered By:Gwen Fiore;   · Keep your child away from cigarette smoke ; Status:Complete;   Done: 11Oct2017   Ordered;  For:Health Maintenance; Ordered By:Gwen Fiore;   · Protect your child's skin from the effects of the sun ; Status:Complete;   Done: 11Oct2017   Ordered;  For:Health Maintenance; Ordered By:Gwen Fiore;   · The use of pacifiers decreases the risk of SIDS in infants but should be discouraged  after 10months of age ; Status:Complete;   Done: 75HZL6954   Ordered;  For:Health Maintenance; Ordered By:Gwen Fiore;   · To prevent choking, keep small objects away from your child ; Status:Complete;   Done:  82OEI4658   Ordered;  For:Health Maintenance; Ordered By:Gwen Fiore;   · Use a rear-facing car safety seat in the back seat in all vehicles, even for very short trips ;  Status:Complete;   Done: 46MHD2152   Ordered;  For:Health Maintenance; Ordered By:Gwen Fiore;   · Use caution when putting your infant in a bouncer or exersaucer ; Status:Complete;    Done: 70OIZ8053   Ordered;  For:Health Maintenance; Ordered By:Gwen Fiore;   · Stop: Rotavirus (RotaTeq)   For: Health Maintenance; Ordered By:Gwen Fiore; Effective Date:11Oct2017    Follow-up visit in 1 month Evaluation and Treatment  Follow-up  Status: Hold For - Scheduling  Requested for: 71PCQ2146  Ordered; For: Health Maintenance;  Ordered By: [facility]  Performed:   Due: 13VKH9326     Discussion/Summary    Impression:   No growth, development, elimination, feeding, skin and sleep concerns  no medical problems  Anticipatory guidance addressed as per the history of present illness section  He is not on any medications  Information discussed with Parent/Guardian       REPEAT SWEAT TEST WAS NORMAL  NEXT WELL CHECK IN 1 MONTH     Future Appointments    Date/Time Provider Specialty Site   2017 01:00 PM Gita Villa MD Pediatrics [address]     Signatures   Electronically signed by : Trent Castillo MD; Oct 11 2017  3:21PM EST                       (Author)

## 2022-04-07 NOTE — PROGRESS NOTES
Obstructive Sleep Apnea (JONA) Screening     Patient:  Marilu Vidal    YOB: 1975      Medical Record #:  [id number]                     Date:  4/7/2022     1. Are you a loud and/or regular snorer? []  Yes       [x] No    2. Have you been observed to gasp or stop breathing during sleep? []  Yes       [x] No    3. Do you feel tired or groggy upon awakening or do you awaken with a headache?           []  Yes       [] No    4. Are you often tired or fatigued during the wake time hours? []  Yes       [] No    5. Do you fall asleep sitting, reading, watching TV or driving? []  Yes       [] No    6. Do you often have problems with memory or concentration? []  Yes       [] No    **If patient's score is ? 3 they are considered high risk for JONA. An Anesthesia provider will evaluate the patient and develop a plan of care the day of surgery. Note:  If the patient's BMI is more than 35 kg m¯² , has neck circumference > 40 cm, and/or high blood pressure the risk is greater (© American Sleep Apnea Association, 2006).

## 2022-04-07 NOTE — PROGRESS NOTES

## 2022-04-12 ENCOUNTER — ANESTHESIA EVENT (OUTPATIENT)
Dept: ENDOSCOPY | Age: 47
End: 2022-04-12
Payer: COMMERCIAL

## 2022-04-12 NOTE — ANESTHESIA PRE PROCEDURE
Department of Anesthesiology  Preprocedure Note       Name:  Noel Stoddard   Age:  52 y.o.  :  1975                                          MRN:  [id number]         Date:  2022      Surgeon: Zahra Turner):  Jaylen Tubbs MD    Procedure: Procedure(s):  COLONOSCOPY    Medications prior to admission:   Prior to Admission medications    Medication Sig Start Date End Date Taking? Authorizing Provider   Glucosamine-Chondroitin 500-400 MG CAPS Take by mouth daily     Historical Provider, MD   multivitamin [facility]) per tablet Take 1 tablet by mouth daily. Historical Provider, MD   Cholecalciferol (D3-1000) 1000 UNITS CAPS Take by mouth daily     Historical Provider, MD       Current medications:    No current facility-administered medications for this encounter. Current Outpatient Medications   Medication Sig Dispense Refill    Glucosamine-Chondroitin 500-400 MG CAPS Take by mouth daily       multivitamin (THERAGRAN) per tablet Take 1 tablet by mouth daily.  Cholecalciferol (D3-1000) 1000 UNITS CAPS Take by mouth daily          Allergies:  No Known Allergies    Problem List:    Patient Active Problem List   Diagnosis Code    Neck pain, chronic M54.2, G89.29    Right hip pain M25.551    Chronic pain of right knee M25.561, G89.29    Adjustment insomnia F51.02    Pure hypercholesterolemia E78.00    Injury of finger of left hand S69. 92XA    Plantar fasciitis M72.2    Sensorineural hearing loss, bilateral H90.3       Past Medical History:        Diagnosis Date    Chronic back pain     djd       Past Surgical History:        Procedure Laterality Date    KNEE SURGERY Right     staph infection    SHOULDER SURGERY Right     labrum repair       Social History:    Social History     Tobacco Use    Smoking status: Never Smoker    Smokeless tobacco: Never Used   Substance Use Topics    Alcohol use: Yes     Comment: 4-5 drinks per week                                Counseling given: Not Answered      Vital Signs (Current):   Vitals:    04/07/22 1533   Weight: 195 lb (88.5 kg)   Height: 6' 6\" (1.981 m)                                              BP Readings from Last 3 Encounters:   10/07/21 118/74   10/05/20 110/70   04/13/20 130/70       NPO Status:                                                                                 BMI:   Wt Readings from Last 3 Encounters:   10/07/21 194 lb 9.6 oz (88.3 kg)   10/05/20 194 lb 9.6 oz (88.3 kg)   04/13/20 190 lb (86.2 kg)     Body mass index is 22.53 kg/m². CBC:   Lab Results   Component Value Date    WBC 3.9 10/07/2021    RBC 5.18 10/07/2021    HGB 14.8 10/07/2021    HCT 45.5 10/07/2021    MCV 87.8 10/07/2021    RDW 13.2 10/07/2021     10/07/2021       CMP:   Lab Results   Component Value Date     10/07/2021    K 4.9 10/07/2021     10/07/2021    CO2 26 10/07/2021    BUN 18 10/07/2021    CREATININE 1.0 10/07/2021    GFRAA >60 10/07/2021    GFRAA >60 07/10/2012    AGRATIO 1.5 07/10/2012    LABGLOM >60 10/07/2021    GLUCOSE 80 10/07/2021    PROT 7.3 10/07/2021    PROT 7.3 07/10/2012    CALCIUM 9.7 10/07/2021    BILITOT 0.5 10/07/2021    ALKPHOS 64 10/07/2021    AST 23 10/07/2021    ALT 24 10/07/2021       POC Tests: No results for input(s): POCGLU, POCNA, POCK, POCCL, POCBUN, POCHEMO, POCHCT in the last 72 hours.     Coags: No results found for: PROTIME, INR, APTT    HCG (If Applicable): No results found for: PREGTESTUR, PREGSERUM, HCG, HCGQUANT     ABGs: No results found for: PHART, PO2ART, COH1ORM, ULY9BZG, BEART, C2DNHGSF     Type & Screen (If Applicable):  No results found for: LABABO, LABRH    Drug/Infectious Status (If Applicable):  No results found for: HIV, HEPCAB    COVID-19 Screening (If Applicable): No results found for: COVID19        Anesthesia Evaluation  Patient summary reviewed and Nursing notes reviewed no history of anesthetic complications:   Airway: Mallampati: II     Neck ROM: full   Dental: Pulmonary:Negative Pulmonary ROS and normal exam                               Cardiovascular:Negative CV ROS                      Neuro/Psych:   Negative Neuro/Psych ROS  (+) neuromuscular disease:,             GI/Hepatic/Renal: Neg GI/Hepatic/Renal ROS       (-) hiatal hernia and GERD       Endo/Other: Negative Endo/Other ROS                    Abdominal:             Vascular: Other Findings:             Anesthesia Plan      general     ASA 2     (I discussed with the patient the risks and benefits of PIV, general anesthesia, IV Narcotics, PACU. All questions were answered the patient agrees with the plan and wishes to proceed.  )  Induction: intravenous. Pre-Operative Diagnosis: Encounter for screening colonoscopy [Z12.11]    52 y.o.   BMI:  Body mass index is 22.53 kg/m².      Vitals:    04/07/22 1533 04/13/22 0832   BP:  130/76   Pulse:  62   Resp:  20   Temp:  98 °F (36.7 °C)   TempSrc:  Temporal   SpO2:  97%   Weight: 195 lb (88.5 kg) 195 lb (88.5 kg)   Height: 6' 6\" (1.981 m) 6' 6\" (1.981 m)       No Known Allergies    Social History     Tobacco Use    Smoking status: Never Smoker    Smokeless tobacco: Never Used   Substance Use Topics    Alcohol use: Yes     Comment: 4-5 drinks per week       LABS:    CBC  Lab Results   Component Value Date/Time    WBC 3.9 (L) 10/07/2021 08:43 AM    HGB 14.8 10/07/2021 08:43 AM    HCT 45.5 10/07/2021 08:43 AM     10/07/2021 08:43 AM     RENAL  Lab Results   Component Value Date/Time     10/07/2021 08:43 AM    K 4.9 10/07/2021 08:43 AM     10/07/2021 08:43 AM    CO2 26 10/07/2021 08:43 AM    BUN 18 10/07/2021 08:43 AM    CREATININE 1.0 10/07/2021 08:43 AM    GLUCOSE 80 10/07/2021 08:43 AM     COAGS  No results found for: PROTIME, INR, APTT      Jennifer Samaniego MD   4/12/2022

## 2022-04-13 ENCOUNTER — ANESTHESIA (OUTPATIENT)
Dept: ENDOSCOPY | Age: 47
End: 2022-04-13
Payer: COMMERCIAL

## 2022-04-13 ENCOUNTER — HOSPITAL ENCOUNTER (OUTPATIENT)
Age: 47
Setting detail: OUTPATIENT SURGERY
Discharge: HOME | End: 2022-04-13
Attending: INTERNAL MEDICINE | Admitting: INTERNAL MEDICINE
Payer: COMMERCIAL

## 2022-04-13 VITALS
TEMPERATURE: 98 F | DIASTOLIC BLOOD PRESSURE: 62 MMHG | OXYGEN SATURATION: 99 % | SYSTOLIC BLOOD PRESSURE: 113 MMHG | WEIGHT: 195 LBS | BODY MASS INDEX: 22.56 KG/M2 | HEART RATE: 58 BPM | HEIGHT: 78 IN | RESPIRATION RATE: 16 BRPM

## 2022-04-13 VITALS
DIASTOLIC BLOOD PRESSURE: 65 MMHG | OXYGEN SATURATION: 97 % | SYSTOLIC BLOOD PRESSURE: 111 MMHG | RESPIRATION RATE: 24 BRPM

## 2022-04-13 PROCEDURE — 2580000003 HC RX 258: Performed by: INTERNAL MEDICINE

## 2022-04-13 PROCEDURE — 7100000011 HC PHASE II RECOVERY - ADDTL 15 MIN: Performed by: INTERNAL MEDICINE

## 2022-04-13 PROCEDURE — 2580000003 HC RX 258: Performed by: NURSE ANESTHETIST, CERTIFIED REGISTERED

## 2022-04-13 PROCEDURE — 3700000001 HC ADD 15 MINUTES (ANESTHESIA): Performed by: INTERNAL MEDICINE

## 2022-04-13 PROCEDURE — 3609027000 HC COLONOSCOPY: Performed by: INTERNAL MEDICINE

## 2022-04-13 PROCEDURE — 7100000010 HC PHASE II RECOVERY - FIRST 15 MIN: Performed by: INTERNAL MEDICINE

## 2022-04-13 PROCEDURE — 6360000002 HC RX W HCPCS: Performed by: NURSE ANESTHETIST, CERTIFIED REGISTERED

## 2022-04-13 PROCEDURE — 3700000000 HC ANESTHESIA ATTENDED CARE: Performed by: INTERNAL MEDICINE

## 2022-04-13 PROCEDURE — 2709999900 HC NON-CHARGEABLE SUPPLY: Performed by: INTERNAL MEDICINE

## 2022-04-13 RX ORDER — SODIUM CHLORIDE, SODIUM LACTATE, POTASSIUM CHLORIDE, CALCIUM CHLORIDE 600; 310; 30; 20 MG/100ML; MG/100ML; MG/100ML; MG/100ML
INJECTION, SOLUTION INTRAVENOUS CONTINUOUS PRN
Status: DISCONTINUED | OUTPATIENT
Start: 2022-04-13 | End: 2022-04-13 | Stop reason: SDUPTHER

## 2022-04-13 RX ORDER — PROPOFOL 10 MG/ML
INJECTION, EMULSION INTRAVENOUS PRN
Status: DISCONTINUED | OUTPATIENT
Start: 2022-04-13 | End: 2022-04-13 | Stop reason: SDUPTHER

## 2022-04-13 RX ORDER — SODIUM CHLORIDE, SODIUM LACTATE, POTASSIUM CHLORIDE, CALCIUM CHLORIDE 600; 310; 30; 20 MG/100ML; MG/100ML; MG/100ML; MG/100ML
INJECTION, SOLUTION INTRAVENOUS ONCE
Status: COMPLETED | OUTPATIENT
Start: 2022-04-13 | End: 2022-04-13

## 2022-04-13 RX ORDER — LIDOCAINE HYDROCHLORIDE 10 MG/ML
0.1 INJECTION, SOLUTION EPIDURAL; INFILTRATION; INTRACAUDAL; PERINEURAL
Status: DISCONTINUED | OUTPATIENT
Start: 2022-04-13 | End: 2022-04-13 | Stop reason: HOSPADM

## 2022-04-13 RX ADMIN — SODIUM CHLORIDE, SODIUM LACTATE, POTASSIUM CHLORIDE, AND CALCIUM CHLORIDE: .6; .31; .03; .02 INJECTION, SOLUTION INTRAVENOUS at 08:49

## 2022-04-13 RX ADMIN — SODIUM CHLORIDE, POTASSIUM CHLORIDE, SODIUM LACTATE AND CALCIUM CHLORIDE: 600; 310; 30; 20 INJECTION, SOLUTION INTRAVENOUS at 08:45

## 2022-04-13 RX ADMIN — PROPOFOL 550 MG: 10 INJECTION, EMULSION INTRAVENOUS at 08:54

## 2022-04-13 ASSESSMENT — PAIN - FUNCTIONAL ASSESSMENT: PAIN_FUNCTIONAL_ASSESSMENT: 0-10

## 2022-04-13 NOTE — ANESTHESIA POSTPROCEDURE EVALUATION
Department of Anesthesiology  Postprocedure Note    Patient: Jose Hair  MRN: [id number]  YOB: 1975  Date of evaluation: 4/13/2022  Time:  10:59 AM     Procedure Summary     Date: 04/13/22 Room / Location: [facility] [place] [address] / [facility]    Anesthesia Start: 7168 Anesthesia Stop: 6361    Procedure: COLONOSCOPY (N/A ) Diagnosis:       Encounter for screening colonoscopy      (Encounter for screening colonoscopy [Z12.11])    Surgeons: Trinidad Pulido MD Responsible Provider: Malik Stover MD    Anesthesia Type: general ASA Status: 2          Anesthesia Type: general    Abeba Phase I: Abeba Score: 10    Abeba Phase II: Abeba Score: 9    Last vitals: Reviewed and per EMR flowsheets.        Anesthesia Post Evaluation    Comments: Postoperative Anesthesia Note    Name:    Jose Hair  MRN:      [id number]    Patient Vitals in the past 12 hrs:  04/13/22 0957, BP:113/62, Pulse:58, Resp:16, SpO2:99 %  04/13/22 0947, BP:112/65, Pulse:60, Resp:16, SpO2:99 %  04/13/22 0937, BP:(!) 111/59, Pulse:57, Resp:16, SpO2:96 %  04/13/22 0927, BP:(!) 105/58, Pulse:68, Resp:16, SpO2:95 %  04/13/22 0919, BP:(!) 104/59, Pulse:72, Resp:16, SpO2:96 %  04/13/22 0832, BP:130/76, Temp:98 °F (36.7 °C), Temp src:Temporal, Pulse:62, Resp:20, SpO2:97 %, Height:6' 6\" (1.981 m), Weight:195 lb (88.5 kg)     LABS:    CBC  Lab Results       Component                Value               Date/Time                  WBC                      3.9 (L)             10/07/2021 08:43 AM        HGB                      14.8                10/07/2021 08:43 AM        HCT                      45.5                10/07/2021 08:43 AM        PLT                      227                 10/07/2021 08:43 AM   RENAL  Lab Results       Component                Value               Date/Time                  NA                       142                 10/07/2021 08:43 AM        K                        4.9                 10/07/2021 08:43 AM CL                       103                 10/07/2021 08:43 AM        CO2                      26                  10/07/2021 08:43 AM        BUN                      18                  10/07/2021 08:43 AM        CREATININE               1.0                 10/07/2021 08:43 AM        GLUCOSE                  80                  10/07/2021 08:43 AM   COAGS  No results found for: PROTIME, INR, APTT    Intake & Output:  @24HRIO@    Nausea & Vomiting:  No    Level of Consciousness:  Awake    Pain Assessment:  Adequate analgesia    Anesthesia Complications:  No apparent anesthetic complications    SUMMARY      Vital signs stable  OK to discharge from Stage I post anesthesia care.   Care transferred from Anesthesiology department on discharge from perioperative area

## 2022-04-13 NOTE — PROCEDURES
Colonoscopy Procedure  Note          Patient: Gaurav Wallace  : 1975      Procedure: Colonoscopy     Date:  2022    Primary Care Physician: Leslie Berumen DO     Operative surgeon: Michelle Beckman MD  Previous Colonoscopy: None  Consent: I explained and discussed the risk, benefits and alternatives for the procedure with the patient and obtained the patient's consent for the procedure. We discussed the specific risks including bleeding, perforation, post-procedure abdominal pain, and missed lesions which could lead to interval colorectal cancers. History:       Past Medical History:   Diagnosis Date    Chronic back pain     djd      Preoperative Diagnosis: Encounter for screening colonoscopy [Z12.11]  Post Operative Diagnosis: Normal  ASA: 2  SEDATION: MAC      Procedures Performed: Colonoscopy   Scope Type: Adult    Procedure Details:      With the patient in left lateral decubitus position the endoscope was inserted through the anorectal area into the rectum. The scope was then advanced through the length of the colon to the cecum and terminal ileum. The quality of preparation was good. The scope was carefully withdrawn with careful inspection. Images were taken of the multiple segments of colon, cecum, IC valve, rectum, terminal ileum. Retroflexion was preformed in the rectum. Cecum Intubated: Yes  EBL: minimal to none  Complications:  no complications were noted  Post-operative Findings: Perianal exam unremarkable, digital rectal exam unremarkable, retroflexion rectum did not demonstrate significant hemorrhoids    The colonic mucosa was normal throughout the entirety of the colon. The terminal ileum was intubated was normal.  No polyps or lesions seen. Plan: Repeat colonoscopy in 10 years for colon cancer screening purposes.   Signed By: MD Michelle Daniels MD,   [facility]  2022      Please note that some or all of this record was generated using voice recognition software. If there are any questions about the content of this document, please contact the author as some errors in translation may have occurred.

## 2022-04-13 NOTE — H&P
[address]   Pre-operative History and Physical    Patient: Temi Gordon  : 1975  Acct#:     HISTORY OF PRESENT ILLNESS:    The patient is a 52 y.o. male who presents for colon cancer screening, average risk    Indications: Colon cancer screening, average risk    Past Medical History:        Diagnosis Date    Chronic back pain     djd      Past Surgical History:        Procedure Laterality Date    KNEE SURGERY Right     staph infection    SHOULDER SURGERY Right     labrum repair      Medications Prior to Admission:   No current facility-administered medications on file prior to encounter. Current Outpatient Medications on File Prior to Encounter   Medication Sig Dispense Refill    Glucosamine-Chondroitin 500-400 MG CAPS Take by mouth daily       multivitamin (THERAGRAN) per tablet Take 1 tablet by mouth daily.  Cholecalciferol (D3-1000) 1000 UNITS CAPS Take by mouth daily           Allergies:  Patient has no known allergies.     Social History:   Social History     Socioeconomic History    Marital status:      Spouse name: Not on file    Number of children: 3    Years of education: Not on file    Highest education level: Not on file   Occupational History    Occupation: teacher     Comment: [employer]   Tobacco Use    Smoking status: Never Smoker    Smokeless tobacco: Never Used   Vaping Use    Vaping Use: Never used   Substance and Sexual Activity    Alcohol use: Yes     Comment: 4-5 drinks per week    Drug use: No    Sexual activity: Not on file   Other Topics Concern    Not on file   Social History Narrative    Not on file     Social Determinants of Health     Financial Resource Strain: Low Risk     Difficulty of Paying Living Expenses: Not hard at all   Food Insecurity: No Food Insecurity    Worried About [address] in the Last Year: Never true    Jorge [employer] of Food in the Last Year: Never true   Transportation Needs:     Lack of Transportation (Medical): Not on file    Lack of Transportation (Non-Medical): Not on file   Physical Activity:     Days of Exercise per Week: Not on file    Minutes of Exercise per Session: Not on file   Stress:     Feeling of Stress : Not on file   Social Connections:     Frequency of Communication with Friends and Family: Not on file    Frequency of Social Gatherings with Friends and Family: Not on file    Attends Gnosticist Services: Not on file    Active Member of [address] [place] [address] [employer] or Organizations: Not on file    Attends Club or Organization Meetings: Not on file    Marital Status: Not on file   Intimate Partner Violence:     Fear of Current or Ex-Partner: Not on file    Emotionally Abused: Not on file    Physically Abused: Not on file    Sexually Abused: Not on file   Housing Stability:     Unable to Pay for Housing in the Last Year: Not on file    Number of [place] in the Last Year: Not on file    Unstable Housing in the Last Year: Not on file      Family History:       Problem Relation Age of Onset    Cancer Mother         skin    High Cholesterol Father     Stroke Maternal Grandmother     Stroke Maternal Grandfather         skin-unsure of type    High Cholesterol Paternal Grandmother         PHYSICAL EXAM:      /76   Pulse 62   Temp 98 °F (36.7 °C) (Temporal)   Resp 20   Ht 6' 6\" (1.981 m)   Wt 195 lb (88.5 kg)   SpO2 97%   BMI 22.53 kg/m²  I        Heart:  Normal apical impulse, regular rate and rhythm, normal S1 and S2, no S3 or S4, and no murmur noted    Lungs:  No increased work of breathing, good air exchange, clear to auscultation bilaterally, no crackles or wheezing    Abdomen:  No scars, normal bowel sounds, soft, non-distended, non-tender, no masses palpated, no hepatosplenomegally      ASA Class  ASA 1 - Normal health patient    Mallampati Class: 2      ASSESSMENT AND PLAN:    1. Patient is a suitable candidate for endoscopic procedure and attendant anesthesia  2.   Risks, benefits, alternatives of procedure discussed in detail with patient including risks of bleeding, infection, perforation, risks of sedation, risks of missed lesions. The patient wishes to proceed.

## 2022-10-05 ENCOUNTER — OFFICE VISIT (OUTPATIENT)
Dept: ENT CLINIC | Age: 47
End: 2022-10-05
Payer: COMMERCIAL

## 2022-10-05 ENCOUNTER — PROCEDURE VISIT (OUTPATIENT)
Dept: AUDIOLOGY | Age: 47
End: 2022-10-05
Payer: COMMERCIAL

## 2022-10-05 VITALS — TEMPERATURE: 97.9 F | HEIGHT: 78 IN | WEIGHT: 200 LBS | BODY MASS INDEX: 23.14 KG/M2 | RESPIRATION RATE: 16 BRPM

## 2022-10-05 DIAGNOSIS — H93.13 TINNITUS, BILATERAL: ICD-10-CM

## 2022-10-05 DIAGNOSIS — H90.3 SENSORINEURAL HEARING LOSS, BILATERAL: Primary | ICD-10-CM

## 2022-10-05 DIAGNOSIS — H90.3 SENSORINEURAL HEARING LOSS (SNHL), BILATERAL: Primary | ICD-10-CM

## 2022-10-05 PROCEDURE — 99203 OFFICE O/P NEW LOW 30 MIN: CPT | Performed by: OTOLARYNGOLOGY

## 2022-10-05 PROCEDURE — 92557 COMPREHENSIVE HEARING TEST: CPT | Performed by: AUDIOLOGIST

## 2022-10-05 ASSESSMENT — ENCOUNTER SYMPTOMS
WHEEZING: 0
SHORTNESS OF BREATH: 0
ABDOMINAL PAIN: 0

## 2022-10-05 NOTE — PROGRESS NOTES
[address], [address], [address] [place] [address]  P: [phone]       Patient     Haskel Carrel  1975    Chief Concern     Chief Complaint   Patient presents with    New Patient     Patient states that he is just here to have hearing checked, states that he has trouble hearing in a crowd. Here to monitor        Assessment and Plan      Diagnosis Orders   1. Sensorineural hearing loss (SNHL), bilateral          Mild-moderate downsloping SNHL bilaterally - no change from 2 years ago. May follow up with repeat audiometric testing every 2-3 years as desired, call clinic with any worsening in hearing, tinnitus, or any vertigo. History of pole-vaulting in high school, state champion. Return if symptoms worsen or fail to improve. History of Present Illness     52 y.o. male with bilateral hearing loss and tinnitus (non-pulsatile, tonal, constant, occasionally intrusive) - no otorrhea, otalgia, vertigo. SNHL ongoing for at least 2 years, no acute hearing loss, no history of noise exposure, no familial hearing loss.      Past Medical History     Past Medical History:   Diagnosis Date    Chronic back pain     djd       Past Surgical History     Past Surgical History:   Procedure Laterality Date    COLONOSCOPY N/A 04/13/2022    COLONOS[facility] performed by Ingrid M[facility]enzie MD at [facility] [address] Right     staph infection    SHOULDER SURGERY Right     labrum repair       Family History     Family History   Problem Relation Age of Onset    Cancer Mother         skin    High Cholesterol Father     Stroke Maternal Grandmother     Stroke Maternal Grandfather         skin-unsure of type    High Cholesterol Paternal Grandmother        Social History     Social History     Tobacco Use    Smoking status: Never    Smokeless tobacco: Never   Vaping Use    Vaping Use: Never used   Substance Use Topics    Alcohol use: Yes     Comment: 4-5 drinks per week    Drug use: No        Allergies No Known Allergies    Medications     Current Outpatient Medications   Medication Sig Dispense Refill    Glucosamine-Chondroitin 500-400 MG CAPS Take by mouth daily       multivitamin (THERAGRAN) per tablet Take 1 tablet by mouth daily. vitamin D 25 MCG (1000 UT) CAPS Take by mouth daily        No current facility-administered medications for this visit. Review of Systems     Review of Systems   Constitutional:  Negative for activity change, chills, diaphoresis, fatigue and fever. HENT:  Positive for hearing loss and tinnitus. Negative for congestion. Eyes:  Negative for visual disturbance. Respiratory:  Negative for shortness of breath and wheezing. Cardiovascular:  Negative for chest pain. Gastrointestinal:  Negative for abdominal pain. Musculoskeletal:  Negative for neck pain and neck stiffness. Skin:  Negative for rash. Neurological:  Negative for dizziness, light-headedness and headaches. Hematological:  Negative for adenopathy. PhysicalExam     Vitals:    10/05/22 1501   Resp: 16   Temp: 97.9 °F (36.6 °C)   TempSrc: Infrared   Weight: 200 lb (90.7 kg)   Height: 6' 6\" (1.981 m)       Physical Exam  Vitals reviewed. Constitutional:       Appearance: Normal appearance. HENT:      Head: Normocephalic and atraumatic. Right Ear: Tympanic membrane, ear canal and external ear normal. There is no impacted cerumen. Left Ear: Tympanic membrane, ear canal and external ear normal. There is no impacted cerumen. Ears:      Dill exam findings: Does not lateralize. Right Rinne: AC > BC. Left Rinne: AC > BC. Nose: No congestion or rhinorrhea. Mouth/Throat:      Lips: Pink. No lesions. Mouth: Mucous membranes are moist. No oral lesions. Tongue: No lesions. Tongue does not deviate from midline. Palate: No mass and lesions. Pharynx: Oropharynx is clear. Uvula midline. No oropharyngeal exudate or posterior oropharyngeal erythema. Eyes:      Extraocular Movements: Extraocular movements intact. Pupils: Pupils are equal, round, and reactive to light. Musculoskeletal:      Cervical back: Normal range of motion and neck supple. Lymphadenopathy:      Cervical: No cervical adenopathy. Neurological:      Mental Status: He is alert. Cranial Nerves: No cranial nerve deficit. Data Review           Procedure     Romy Paula MD  10/5/22    Portions of this note were dictated using Dragon.  There may be linguistic errors secondary to the use of this program.

## 2022-10-05 NOTE — PATIENT INSTRUCTIONS
Good Communication Strategies    Communication can be challenging for anyone, but can be especially difficult for those with some degree of hearing loss. While we may not be able to control every factor that may lead to difficulty with communication, there are Good Communication Strategies that we can all use in our day-to-day lives. Communication takes both parties working together for it to be successful. Tips as a Listener:   Control your environment. It is important to limit the amount of background noise in the room when possible. You should also consider having a good light source in the room to best see the other person. Ask for clarification. Instead of saying \"What?\", you can use parts of what you heard to make a new question. For example, if you heard the word \"Thursday\" but not the rest of the week, you may ask \"What was that about Thursday? \" or \"What did you want to do Thursday? \". This shows the person talking that you are listening and will help them better explain what they are saying. Be an advocate for yourself. If you are hearing but not understanding, tell the other person \"I can hear you, but I need you to slow down when you speak. \"  Or if someone is facing the other direction, say \"I cannot hear you when you are not looking at me when we talk. \"       Tips as a Talker:   - Sit or stand 3 to 6 feet away to maximize audibility         -- It is unrealistic to believe someone else will fully hear your message if you are speaking from across the room or in a different room in the house   - Stay at eye level to help with visual cues   - Make sure you have the persons attention before speaking   - Use facial expressions and gestures to accentuate your message   - Raise your voice slightly (do not scream)   - Speak slowly and distinctly   - Use short, simple sentences   - Rephrase your words if the person is having a hard time understanding you    - To avoid distortion, dont speak directly into a persons ear      Some additional items that may be helpful:   - Remain patient - this is important for both parties   - Write down items that still cannot be heard/understood. You may write with pen/paper or consider typing/texting on a cell phone or smart device. - If background noise is unavoidable, try to keep yourself in a good position in the room. By sitting at a renteria on the side of the restaurant (preferably a corner), it will be easier to communicate than if you were sitting at a table in the middle with background noise surrounding you. Keep yourself positioned away from music speakers or heavy foot traffic. Tinnitus: Overview and Management Strategies          Many people have some ringing sounds in their ears once in a while. You may hear a roar, a hiss, a tinkle, or a buzz. The sound usually lasts only a few minutes. If it goes on all the time, you may have tinnitus. Tinnitus is usually caused by long-term exposure to loud noise. This damages the nerves in the inner ear. It can occur with all types of hearing loss. It may be a symptom of almost any ear problem. Tinnitus may be caused by a buildup of earwax. Or, it may be caused by ear infections or certain medicines (especially antibiotics or large amounts of aspirin). You can also hear noises in your ears because of an injury to the ears, drinking too much alcohol or caffeine, or a medical condition. Other conditions may also contribute to tinnitus, including: head and neck trauma, temporomandibular joint disorder (TMJ), sinus pressure and barometric trauma, traumatic brain injury, metabolic disorders, autoimmune disorders, stress, and high blood pressure. You may need tests to evaluate your hearing and to find causes of long-lasting tinnitus. Your doctor may suggest one or more treatments to help you cope with the tinnitus. You can also do things at home to help reduce symptoms.     Follow-up care is a key part of your treatment and safety. Be sure to make and go to all appointments, and call your doctor if you are having problems. It's also a good idea to know your test results and keep a list of the medicines you take. How can you care for yourself at home? Limit or cut out alcohol, caffeine, and sodium. They can make your symptoms worse. Do not smoke or use other tobacco products. Nicotine reduces blood flow to the ear and makes tinnitus worse. If you need help quitting, talk to your doctor about stop-smoking programs and medicines. These can increase your chances of quitting for good. Talk to your doctor about whether to stop taking aspirin and similar products such as ibuprofen or naproxen. Get exercise often. It can help improve blood flow to the ear. Ways to manage/cope with tinnitus  Some tinnitus may last a long time. To manage your tinnitus, try to: Avoid noises that you think caused your tinnitus. If you can't avoid loud noises, wear earplugs or earmuffs. Ignore the sound by paying attention to other things. Keeping your brain busy with other tasks or background noise can help your brain not focus on the tinnitus. Try to not give the tinnitus an emotional reaction. Do your best to ignore the sound and not let it bother you. Relax using biofeedback, meditation, or yoga. Feeling stressed and being tired can make tinnitus worse. Play music or white noise to help you sleep. Background noise may cover up the noise that you hear in your ears. You can buy a tabletop machine or a device that sits under your pillow to play soothing sounds, like ocean waves. Smart phones have free apps, such as Whist, Relax Melodies, ReSound Relief, and [employer]. These apps have different types of sounds/noise, some of which you can blend together to find sounds that are most soothing to you.   Hearing aid technology, especially when there is some hearing loss, may help reduce tinnitus symptoms by giving your brain better access to the sounds it is missing. There are some hearing aids with built-in noise generator programs, which may help when amplification alone is not enough. Additional resources may be found through the American Tinnitus Association at www.elvira.org    When should you call for help? Call 911 anytime you think you may need emergency care. For example, call if:    You have symptoms of a stroke. These may include:  Sudden numbness, tingling, weakness, or loss of movement in your face, arm, or leg, especially on only one side of your body. Sudden vision changes. Sudden trouble speaking. Sudden confusion or trouble understanding simple statements. Sudden problems with walking or balance. A sudden, severe headache that is different from past headaches. Call your doctor now or seek immediate medical care if:    You develop other symptoms. These may include hearing loss (or worse hearing loss), balance problems, dizziness, nausea, or vomiting. Watch closely for changes in your health, and be sure to contact your doctor if:    Your tinnitus moves from both ears to one ear. Your hearing loss gets worse within 1 day after an ear injury. Your tinnitus or hearing loss does not get better within 1 week after an ear injury. Your tinnitus bothers you enough that you want to take medicines to help you cope with it. If you notice changes in your tinnitus and/or your hearing, it is recommended that you have your hearing tested by your audiologist and to follow-up with your physician that manages your hearing loss (such as your ENT or Primary Care doctor).

## 2022-10-05 NOTE — PROGRESS NOTES
Ijeoma Helton   1975, 52 y.o. male   [id number]       Referring Provider: Mackenzie Pineda MD  Referral Type: In an order in [address] [place] [address]    Reason for Visit: Evaluation of suspected change in hearing and tinnitus     ADULT AUDIOLOGIC EVALUATION      Ijeoma Helton is a 52 y.o. male seen today, 10/5/2022 , for an recheck audiologic evaluation. Patient was seen by Mackenzie Pineda MD following today's evaluation. AUDIOLOGIC AND OTHER PERTINENT MEDICAL HISTORY:      Ijeoma Helton noted possible change in hearing, bilaterally with continued difficulty hearing details of conversation especially with background noise. Per previous evaluation, patient notes non-bothersome tinnitus, bilaterally and family history of age-related hearing loss. No additional changes to otologic or medical history were reported. Ijeoma Helton denied otalgia, aural fullness, otorrhea, dizziness, imbalance, history of falls, history of occupational/recreational noise exposure, history of head trauma and history of ear surgery. Date: 10/5/2022     IMPRESSIONS:      Today's results revealed a symmetric high-frequency sensorineural hearing loss with excellent word recognition, bilaterally. Hearing stable compared to previous audiogram. Follow medical recommendations of Mackenzie Pineda MD.     ASSESSMENT AND FINDINGS:     Otoscopy revealed: Clear ear canals bilaterally    RIGHT EAR:  Hearing Sensitivity: Within normal limits through 2kHz sloping to a mild to moderate sensorineural hearing loss. Speech Recognition Threshold: 10 dB HL  Word Recognition: Excellent 96%, based on NU-6 25-word list at 50 dBHL masked using recorded speech stimuli. LEFT EAR:  Hearing Sensitivity: Within normal limits through 2kHz sloping to a mild to moderate sensorineural hearing loss. Speech Recognition Threshold: 5 dB HL  Word Recognition: Excellent 100%, based on NU-6 25-word list at 50 dBHL using recorded speech stimuli.       Reliability: Good   Transducer: Inserts    See scanned audiogram dated 10/5/2022  for results. PATIENT EDUCATION:       The following items were discussed with the patient:   - Good Communication Strategies  - Tinnitus Management Strategies      Educational information was shared in the After Visit Summary. RECOMMENDATIONS:                                                                                                                                                                                                                                                            The following items are recommended based on patient report and results from today's appointment:   - Continue medical follow-up with Wing Florence MD.   - Retest hearing as medically indicated and/or sooner if a change in hearing is noted. - Utilize \"Good Communication Strategies\" as discussed to assist in speech understanding with communication partners. - Maintain a sound enriched environment to assist in the management of tinnitus symptoms.        Kristian Long  Audiologist    Chart CC'd to: Wing Florence MD      Degree of   Hearing Sensitivity dB Range   Within Normal Limits (WNL) 0 - 20   Mild 20 - 40   Moderate 40 - 55   Moderately-Severe 55 - 70   Severe 70 - 90   Profound 90 +

## 2022-10-24 ENCOUNTER — OFFICE VISIT (OUTPATIENT)
Dept: PRIMARY CARE CLINIC | Age: 47
End: 2022-10-24
Payer: COMMERCIAL

## 2022-10-24 VITALS
OXYGEN SATURATION: 98 % | HEART RATE: 68 BPM | TEMPERATURE: 97.2 F | WEIGHT: 202.6 LBS | SYSTOLIC BLOOD PRESSURE: 110 MMHG | HEIGHT: 78 IN | BODY MASS INDEX: 23.44 KG/M2 | DIASTOLIC BLOOD PRESSURE: 78 MMHG

## 2022-10-24 DIAGNOSIS — M72.2 PLANTAR FASCIITIS: ICD-10-CM

## 2022-10-24 DIAGNOSIS — E78.00 PURE HYPERCHOLESTEROLEMIA: ICD-10-CM

## 2022-10-24 DIAGNOSIS — Z00.00 HEALTH CARE MAINTENANCE: ICD-10-CM

## 2022-10-24 DIAGNOSIS — H90.3 SENSORINEURAL HEARING LOSS, BILATERAL: Primary | ICD-10-CM

## 2022-10-24 DIAGNOSIS — M25.851 RIGHT HIP IMPINGEMENT SYNDROME: ICD-10-CM

## 2022-10-24 PROCEDURE — 99396 PREV VISIT EST AGE 40-64: CPT | Performed by: FAMILY MEDICINE

## 2022-10-24 RX ORDER — DICLOFENAC SODIUM 75 MG/1
75 TABLET, DELAYED RELEASE ORAL EVERY OTHER DAY
COMMUNITY
Start: 2022-10-11

## 2022-10-24 ASSESSMENT — ANXIETY QUESTIONNAIRES
GAD7 TOTAL SCORE: 0
5. BEING SO RESTLESS THAT IT IS HARD TO SIT STILL: 0
2. NOT BEING ABLE TO STOP OR CONTROL WORRYING: 0
6. BECOMING EASILY ANNOYED OR IRRITABLE: 0
4. TROUBLE RELAXING: 0
3. WORRYING TOO MUCH ABOUT DIFFERENT THINGS: 0
1. FEELING NERVOUS, ANXIOUS, OR ON EDGE: 0
7. FEELING AFRAID AS IF SOMETHING AWFUL MIGHT HAPPEN: 0
IF YOU CHECKED OFF ANY PROBLEMS ON THIS QUESTIONNAIRE, HOW DIFFICULT HAVE THESE PROBLEMS MADE IT FOR YOU TO DO YOUR WORK, TAKE CARE OF THINGS AT HOME, OR GET ALONG WITH OTHER PEOPLE: NOT DIFFICULT AT ALL

## 2022-10-24 ASSESSMENT — PATIENT HEALTH QUESTIONNAIRE - PHQ9
6. FEELING BAD ABOUT YOURSELF - OR THAT YOU ARE A FAILURE OR HAVE LET YOURSELF OR YOUR FAMILY DOWN: 0
3. TROUBLE FALLING OR STAYING ASLEEP: 1
5. POOR APPETITE OR OVEREATING: 0
4. FEELING TIRED OR HAVING LITTLE ENERGY: 1
2. FEELING DOWN, DEPRESSED OR HOPELESS: 0
SUM OF ALL RESPONSES TO PHQ QUESTIONS 1-9: 2
10. IF YOU CHECKED OFF ANY PROBLEMS, HOW DIFFICULT HAVE THESE PROBLEMS MADE IT FOR YOU TO DO YOUR WORK, TAKE CARE OF THINGS AT HOME, OR GET ALONG WITH OTHER PEOPLE: 0
SUM OF ALL RESPONSES TO PHQ QUESTIONS 1-9: 2
8. MOVING OR SPEAKING SO SLOWLY THAT OTHER PEOPLE COULD HAVE NOTICED. OR THE OPPOSITE, BEING SO FIGETY OR RESTLESS THAT YOU HAVE BEEN MOVING AROUND A LOT MORE THAN USUAL: 0
1. LITTLE INTEREST OR PLEASURE IN DOING THINGS: 0
SUM OF ALL RESPONSES TO PHQ9 QUESTIONS 1 & 2: 0
SUM OF ALL RESPONSES TO PHQ QUESTIONS 1-9: 2
9. THOUGHTS THAT YOU WOULD BE BETTER OFF DEAD, OR OF HURTING YOURSELF: 0
SUM OF ALL RESPONSES TO PHQ QUESTIONS 1-9: 2
7. TROUBLE CONCENTRATING ON THINGS, SUCH AS READING THE NEWSPAPER OR WATCHING TELEVISION: 0

## 2022-10-24 ASSESSMENT — ENCOUNTER SYMPTOMS
COUGH: 0
SHORTNESS OF BREATH: 0
NAUSEA: 0
VOMITING: 0

## 2022-10-24 NOTE — PROGRESS NOTES
[address] [facility]                                             [address] [facility],  [place], [place]        Phone: [phone]      Name:  Jess Sanders  :    1975    Consultants:   Patient Care Team:  Beatriz Escalona DO as PCP - General (Family Medicine)  Beatriz Escalona DO as PCP - [facility] Empaneled Provider    Chief Complaint:     Jess Sanders is a 52 y.o. male  who presents today for an established patient care visit with Personalized Prevention Plan Services as noted below. HPI:       49-year-old  male, non-smoker, with a history of right hip impingement syndrome, Planter fasciitis, history of right shoulder chronic subluxation with plication, presents today for the following: Today for physical.  He states that he was seeing another provider previously but his wife sees me and would like to get established. Overall states he is doing well. Declines any acute complaints or concerns. Does state that he is likely due for some blood work. Right hip impingement. Patient does states that he used to run but has been seeing orthopedic surgery and has been diagnosed with right hip impingement syndrome. Declines any acute complaints today. History of plantar fasciitis. Does state that he is received 2 corticosteroid injections bilaterally without much relief. States he does try his stretches as well as arches. Declines any acute complaints or concerns today. Right shoulder pain. Admits to a history of plication after multiple episodes of subluxation. States he was a [address] in high school and in college. Declines any acute complaints or concerns today. Patient declines any further acute complaints or concerns today.          Patient Active Problem List   Diagnosis    Chronic pain of right knee    Adjustment insomnia    Pure hypercholesterolemia    Plantar Virus Vaccine 10/18/2013, 10/09/2014, 10/08/2015, 10/14/2019, 10/13/2020, 10/04/2021, 10/04/2022    Influenza Whole 10/18/2013, 10/09/2014    Influenza, FLUARIX, FLULAVAL, FLUZONE (age 10 mo+) AND AFLURIA, (age 1 y+), PF, 0.5mL 10/14/2019    Td (Adult), 2 Lf Tetanus Toxoid, Pf (Td, Absorbed) 07/22/2020    Tdap (Boostrix, Adacel) 07/30/2012         Review of Systems:  Review of Systems   Constitutional:  Negative for chills and fever. Respiratory:  Negative for cough and shortness of breath. Cardiovascular:  Negative for chest pain and palpitations. Gastrointestinal:  Negative for nausea and vomiting. Neurological:  Negative for dizziness and weakness. Physical Exam:   Vitals:    10/24/22 1520   BP: 110/78   Pulse: 68   Temp: 97.2 °F (36.2 °C)   SpO2: 98%   Weight: 202 lb 9.6 oz (91.9 kg)   Height: 6' 6\" (1.981 m)     Body mass index is 23.41 kg/m². Wt Readings from Last 3 Encounters:   10/24/22 202 lb 9.6 oz (91.9 kg)   10/05/22 200 lb (90.7 kg)   04/13/22 195 lb (88.5 kg)       BP Readings from Last 3 Encounters:   10/24/22 110/78   04/13/22 111/65   04/13/22 113/62       Physical Exam  Constitutional:       Appearance: Normal appearance. HENT:      Head: Normocephalic and atraumatic. Cardiovascular:      Rate and Rhythm: Normal rate and regular rhythm. Pulmonary:      Effort: Pulmonary effort is normal.      Breath sounds: Normal breath sounds. Neurological:      General: No focal deficit present. Mental Status: He is alert and oriented to person, place, and time. Psychiatric:         Mood and Affect: Mood normal.         Behavior: Behavior normal.            Lab Review:   No visits with results within 6 Month(s) from this visit.    Latest known visit with results is:   Office Visit on 10/07/2021   Component Date Value    WBC 10/07/2021 3.9 (A)     RBC 10/07/2021 5.18     Hemoglobin 10/07/2021 14.8     Hematocrit 10/07/2021 45.5     MCV 10/07/2021 87.8     MCH 10/07/2021 28.5     MCHC 10/07/2021 32.5     RDW 10/07/2021 13.2     Platelets 27/52/4074 227     MPV 10/07/2021 9.3     Total Protein 10/07/2021 7.3     Albumin 10/07/2021 4.6     Alkaline Phosphatase 10/07/2021 64     ALT 10/07/2021 24     AST 10/07/2021 23     Total Bilirubin 10/07/2021 0.5     Bilirubin, Direct 10/07/2021 <0.2     Bilirubin, Indirect 10/07/2021 see below     Sodium 10/07/2021 142     Potassium 10/07/2021 4.9     Chloride 10/07/2021 103     CO2 10/07/2021 26     Anion Gap 10/07/2021 13     Glucose 10/07/2021 80     BUN 10/07/2021 18     Creatinine 10/07/2021 1.0     GFR Non- 10/07/2021 >60     GFR  10/07/2021 >60     Calcium 10/07/2021 9.7     Phosphorus 10/07/2021 2.7     TSH 10/07/2021 0.84     Cholesterol, Total 10/07/2021 187     Triglycerides 10/07/2021 44     HDL 10/07/2021 58     LDL Calculated 10/07/2021 120 (A)     VLDL Cholesterol Calcula* 10/07/2021 9     HIV Ag/Ab 10/07/2021 Non-Reactive     HIV-1 Antibody 10/07/2021 Non-Reactive     HIV ANTIGEN 10/07/2021 Non-Reactive     HIV-2 Ab 10/07/2021 Non-Reactive     Hep C Ab Interp 10/07/2021 Non-reactive           Assessment/Plan:  Minerva Alcantar was seen today for new patient. Diagnoses and all orders for this visit:    Sensorineural hearing loss, bilateral    Plantar fasciitis    Right hip impingement syndrome    Pure hypercholesterolemia    Health care maintenance  -     Lipid Panel; Future  -     Comprehensive Metabolic Panel;  Future  -     TSH with Reflex to FT4; Future  -     Hemoglobin A1C; Future        Health Maintenance Due:  Health Maintenance Due   Topic Date Due    COVID-19 Vaccine (4 - Booster for Pfizer series) 02/28/2022    Depression Screen  10/07/2022      77-year-old  male, non-smoker, with a history of right hip impingement syndrome, Planter fasciitis, history of right shoulder chronic subluxation with plication, presents today for the following:    Health maintenance/routine physical.    - Age-appropriate counseling completed today. Baseline labs ordered. Will await those results to further delineate medical management. History of hyperlipidemia. - Unknown status. We will recheck lipids today to assess ASCVD risk. Right hip impingement syndrome.    - No acute complaints or concerns. Continue follow-up with orthopedic surgery as scheduled. Bilateral plantar fasciitis. - No acute complaints or concerns today. Recommend eccentric stretches as well as inserts as needed. Right shoulder pain. - No acute complaints or concerns. History of chronic subluxation with plication. Recommended physical therapy for him. Improved range of motion and strengthening. Patient declines at this point. Return precautions discussed. Health care decision maker:  <72years old      Health Maintenance: (USPSTF Recommendations)  (M) Prostate Cancer Screen: (54-79 yo discuss benefits/harm, does not recommend testing PSA in men >73 yo (D):   (M) AAA Screen: (men 73-67 yo who has ever smoked (B), consider in nonsmokers if high risk):  CRC/Colonoscopy Screening: (adults 39-53 (B), 50-75 (A))  Lung Ca Screening: Annual LDCT (+smoker age 49-80, smoked within 15 years, total of 20 pack yr history (B)):  DEXA Screen: (women >65 and older, <65 if at risk/postmenopausal (B))  HIV Screen: (16-65 yr old, and all pregnant patients (A)):NEG 10/7/2021  Hep C Screen: (18-79 yr old (B)): NEG 10/7/2021  [facility] [address] Screen: (all pts with cirrhosis and high risk Hep B (US q6 mo)):  Immunizations:    RTC:  Return in about 1 year (around 10/24/2023) for 1 year or PRN. Return to clinic yearly or as needed. - Will await labs to further delineate medical management. EMR Dragon/transcription disclaimer:  Much of this encounter note is electronic transcription/translation of spoken language to printed texts.   The electronic translation of spoken language may be erroneous, or at times, nonsensical words or phrases may be inadvertently transcribed.   Although I have reviewed the note for such errors, some may still exist.

## 2022-10-25 LAB
A/G RATIO: 2.1 (ref 1.1–2.2)
ALBUMIN SERPL-MCNC: 4.8 G/DL (ref 3.4–5)
ALP BLD-CCNC: 62 U/L (ref 40–129)
ALT SERPL-CCNC: 29 U/L (ref 10–40)
ANION GAP SERPL CALCULATED.3IONS-SCNC: 11 MMOL/L (ref 3–16)
AST SERPL-CCNC: 27 U/L (ref 15–37)
BILIRUB SERPL-MCNC: <0.2 MG/DL (ref 0–1)
BUN BLDV-MCNC: 18 MG/DL (ref 7–20)
CALCIUM SERPL-MCNC: 9.8 MG/DL (ref 8.3–10.6)
CHLORIDE BLD-SCNC: 102 MMOL/L (ref 99–110)
CHOLESTEROL, TOTAL: 184 MG/DL (ref 0–199)
CO2: 28 MMOL/L (ref 21–32)
CREAT SERPL-MCNC: 1 MG/DL (ref 0.9–1.3)
ESTIMATED AVERAGE GLUCOSE: 105.4 MG/DL
GFR SERPL CREATININE-BSD FRML MDRD: >60 ML/MIN/{1.73_M2}
GLUCOSE BLD-MCNC: 86 MG/DL (ref 70–99)
HBA1C MFR BLD: 5.3 %
HDLC SERPL-MCNC: 53 MG/DL (ref 40–60)
LDL CHOLESTEROL CALCULATED: 115 MG/DL
POTASSIUM SERPL-SCNC: 4.6 MMOL/L (ref 3.5–5.1)
SODIUM BLD-SCNC: 141 MMOL/L (ref 136–145)
TOTAL PROTEIN: 7.1 G/DL (ref 6.4–8.2)
TRIGL SERPL-MCNC: 80 MG/DL (ref 0–150)
TSH REFLEX FT4: 0.9 UIU/ML (ref 0.27–4.2)
VLDLC SERPL CALC-MCNC: 16 MG/DL

## 2022-10-26 NOTE — RESULT ENCOUNTER NOTE
Please see above for lab results. We will review these in further detail at your follow up appointment. Please be sure to keep your follow up appointment as scheduled. Screening Results:    Cholesterol Screening  - The 10-year ASCVD risk score (Saniya SR, et al., 2019) is: 1.6%    Values used to calculate the score:      Age: 52 years      Sex: Male      Is Non- : No      Diabetic: No      Tobacco smoker: No      Systolic Blood Pressure: 231 mmHg      Is BP treated: No      HDL Cholesterol: 53 mg/dL      Total Cholesterol: 184 mg/dL  - No indication for medication at this point. Pending further labs.

## 2023-07-24 ENCOUNTER — OFFICE VISIT (OUTPATIENT)
Dept: PRIMARY CARE CLINIC | Age: 48
End: 2023-07-24
Payer: COMMERCIAL

## 2023-07-24 VITALS
BODY MASS INDEX: 24.35 KG/M2 | TEMPERATURE: 97.1 F | HEIGHT: 77 IN | WEIGHT: 206.2 LBS | RESPIRATION RATE: 18 BRPM | HEART RATE: 65 BPM | DIASTOLIC BLOOD PRESSURE: 72 MMHG | SYSTOLIC BLOOD PRESSURE: 122 MMHG | OXYGEN SATURATION: 99 %

## 2023-07-24 DIAGNOSIS — Z13.1 SCREENING FOR DIABETES MELLITUS: ICD-10-CM

## 2023-07-24 DIAGNOSIS — Z13.220 SCREENING FOR HYPERLIPIDEMIA: ICD-10-CM

## 2023-07-24 DIAGNOSIS — Z00.00 WELL ADULT EXAM: Primary | ICD-10-CM

## 2023-07-24 DIAGNOSIS — M54.50 CHRONIC MIDLINE LOW BACK PAIN WITHOUT SCIATICA: ICD-10-CM

## 2023-07-24 DIAGNOSIS — G89.29 CHRONIC MIDLINE LOW BACK PAIN WITHOUT SCIATICA: ICD-10-CM

## 2023-07-24 PROCEDURE — 99396 PREV VISIT EST AGE 40-64: CPT | Performed by: STUDENT IN AN ORGANIZED HEALTH CARE EDUCATION/TRAINING PROGRAM

## 2023-07-24 SDOH — ECONOMIC STABILITY: HOUSING INSECURITY
IN THE LAST 12 MONTHS, WAS THERE A TIME WHEN YOU DID NOT HAVE A STEADY PLACE TO SLEEP OR SLEPT IN A SHELTER (INCLUDING NOW)?: NO

## 2023-07-24 SDOH — ECONOMIC STABILITY: FOOD INSECURITY: WITHIN THE PAST 12 MONTHS, YOU WORRIED THAT YOUR FOOD WOULD RUN OUT BEFORE YOU GOT MONEY TO BUY MORE.: NEVER TRUE

## 2023-07-24 SDOH — ECONOMIC STABILITY: INCOME INSECURITY: HOW HARD IS IT FOR YOU TO PAY FOR THE VERY BASICS LIKE FOOD, HOUSING, MEDICAL CARE, AND HEATING?: NOT VERY HARD

## 2023-07-24 SDOH — ECONOMIC STABILITY: FOOD INSECURITY: WITHIN THE PAST 12 MONTHS, THE FOOD YOU BOUGHT JUST DIDN'T LAST AND YOU DIDN'T HAVE MONEY TO GET MORE.: NEVER TRUE

## 2023-07-24 ASSESSMENT — PATIENT HEALTH QUESTIONNAIRE - PHQ9
2. FEELING DOWN, DEPRESSED OR HOPELESS: 0
1. LITTLE INTEREST OR PLEASURE IN DOING THINGS: 0
10. IF YOU CHECKED OFF ANY PROBLEMS, HOW DIFFICULT HAVE THESE PROBLEMS MADE IT FOR YOU TO DO YOUR WORK, TAKE CARE OF THINGS AT HOME, OR GET ALONG WITH OTHER PEOPLE: 0
SUM OF ALL RESPONSES TO PHQ QUESTIONS 1-9: 1
4. FEELING TIRED OR HAVING LITTLE ENERGY: 0
5. POOR APPETITE OR OVEREATING: 0
3. TROUBLE FALLING OR STAYING ASLEEP: 1
7. TROUBLE CONCENTRATING ON THINGS, SUCH AS READING THE NEWSPAPER OR WATCHING TELEVISION: 0
SUM OF ALL RESPONSES TO PHQ9 QUESTIONS 1 & 2: 0
SUM OF ALL RESPONSES TO PHQ QUESTIONS 1-9: 1
8. MOVING OR SPEAKING SO SLOWLY THAT OTHER PEOPLE COULD HAVE NOTICED. OR THE OPPOSITE, BEING SO FIGETY OR RESTLESS THAT YOU HAVE BEEN MOVING AROUND A LOT MORE THAN USUAL: 0
DEPRESSION UNABLE TO ASSESS: FUNCTIONAL CAPACITY MOTIVATION LIMITS ACCURACY
SUM OF ALL RESPONSES TO PHQ QUESTIONS 1-9: 1
6. FEELING BAD ABOUT YOURSELF - OR THAT YOU ARE A FAILURE OR HAVE LET YOURSELF OR YOUR FAMILY DOWN: 0
9. THOUGHTS THAT YOU WOULD BE BETTER OFF DEAD, OR OF HURTING YOURSELF: 0
SUM OF ALL RESPONSES TO PHQ QUESTIONS 1-9: 1

## 2023-07-24 ASSESSMENT — ANXIETY QUESTIONNAIRES
6. BECOMING EASILY ANNOYED OR IRRITABLE: 0
5. BEING SO RESTLESS THAT IT IS HARD TO SIT STILL: 0
1. FEELING NERVOUS, ANXIOUS, OR ON EDGE: 0
3. WORRYING TOO MUCH ABOUT DIFFERENT THINGS: 0
2. NOT BEING ABLE TO STOP OR CONTROL WORRYING: 0
4. TROUBLE RELAXING: 0
7. FEELING AFRAID AS IF SOMETHING AWFUL MIGHT HAPPEN: 0
GAD7 TOTAL SCORE: 0

## 2023-07-24 NOTE — PROGRESS NOTES
2023    Lupe Jj (:  1975) is a 50 y.o. male, here for a preventive medicine evaluation. [address] [place] [address] [facility]                                  [address] [place] [address], [address], [address] [facility] [address]         Phone: [phone]    Date of Service:  2023     Patient ID: . Lupe Jj is a 50 y.o. male       Subjective:     CC: Established well check    HPI  Lupe Jj is a 50 y.o. male with a history of right hip impingement syndrome, plantar fasciitis, and history of right shoulder chronic subluxation with plication who presents for physical well check. Patient reports no major changes in his health or changes in medications. He complains of lower back pain that has been present for years. He denies pain radiation or any muscle weakness. Pain is alleviated by bending forward. Pt denies numbness in lower extremities and denies saddle anesthesia. 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 Had screening colonoscopy in 2022 that was negative. Patient is a non-smoker who drinks approximately 2-3 EtOH drinks for an average week. Patient remains physically active as a . Pt states he eats a heathy diet. Vaccinations are up to date.     ROS:    Constitutional:  Negative for activity or appetite change, fever or fatigue  HENT:  Negative for congestion, sinus pressure, or rhinorrhea  Eyes:  Negative for eye pain or visual changes  Resp:  Negative for SOB, chest tightness, cough  Cardiovascular: Negative for CP, palpitations, SANTOYO, orthopnea, PND, LE edema  Gastrointestinal: Negative for abd pain, melena, BRBPR, N/V/D  Endocrine:  Negative for polydipsia and polyuria  :  Negative for dysuria, flank pain or urinary frequency  Musculoskeletal:  Negative for back pain or myalgias  Neuro:  Negative for dizziness or lightheadedness  Psych: negative for

## 2023-07-25 ENCOUNTER — HOSPITAL ENCOUNTER (OUTPATIENT)
Age: 48
Discharge: HOME | End: 2023-07-25
Payer: COMMERCIAL

## 2023-07-25 DIAGNOSIS — Z13.1 SCREENING FOR DIABETES MELLITUS: ICD-10-CM

## 2023-07-25 DIAGNOSIS — Z13.220 SCREENING FOR HYPERLIPIDEMIA: ICD-10-CM

## 2023-07-25 LAB
CHOLEST SERPL-MCNC: 230 MG/DL (ref 0–199)
EST. AVERAGE GLUCOSE BLD GHB EST-MCNC: 102.5 MG/DL
HBA1C MFR BLD: 5.2 %
HDLC SERPL-MCNC: 65 MG/DL (ref 40–60)
LDLC SERPL CALC-MCNC: 148 MG/DL
TRIGL SERPL-MCNC: 84 MG/DL (ref 0–150)
VLDLC SERPL CALC-MCNC: 17 MG/DL

## 2023-07-25 PROCEDURE — 83036 HEMOGLOBIN GLYCOSYLATED A1C: CPT

## 2023-07-25 PROCEDURE — 36415 COLL VENOUS BLD VENIPUNCTURE: CPT

## 2023-07-25 PROCEDURE — 80061 LIPID PANEL: CPT

## 2023-08-08 ENCOUNTER — HOSPITAL ENCOUNTER (OUTPATIENT)
Dept: PHYSICAL THERAPY | Age: 48
Setting detail: THERAPIES SERIES
Discharge: HOME | End: 2023-08-08
Payer: COMMERCIAL

## 2023-08-08 DIAGNOSIS — M54.50 ACUTE LEFT-SIDED LOW BACK PAIN WITHOUT SCIATICA: Primary | ICD-10-CM

## 2023-08-08 PROCEDURE — 97140 MANUAL THERAPY 1/> REGIONS: CPT | Performed by: PHYSICAL THERAPIST

## 2023-08-08 PROCEDURE — 97110 THERAPEUTIC EXERCISES: CPT | Performed by: PHYSICAL THERAPIST

## 2023-08-08 PROCEDURE — 97161 PT EVAL LOW COMPLEX 20 MIN: CPT | Performed by: PHYSICAL THERAPIST

## 2023-08-08 NOTE — FLOWSHEET NOTE
[address] [place] [address]  [address] [place] [address], [address] [place] [address]  Phone: [phone]  Fax [phone]     Physical Therapy Treatment Note/ Progress Report:         Physical Therapy: TREATMENT/PROGRESS NOTE   Patient: Merly Styles (34 y.o. male)   Examination Date: 2023   :  1975 MRN: [id number]   Visit #: 1    Insurance: Payor: BCBS / Plan: BCBS - [place] PPO / Product Type: *No Product type* /   Insurance ID: [id number] - ([facility])  Secondary Insurance (if applicable):    Treatment Diagnosis:      ICD-10-CM    1. Acute left-sided low back pain without sciatica  M54.50          Medical Diagnosis: Low back pain, unspecified [M54.50]  Other chronic pain [G89.29]        Referring Physician: Cecily Archer*  PCP: No primary care provider on file.                              Plan of care signed (Y/N): NO    Date of Patient follow up with Physician:      Progress Report: EVAL today    Progress report due (10 Rx/or 30 days whichever is less):      Recertification due (POC duration/ or 90 days whichever is less): 23     Visit # Insurance Allowable Auth Needed   1 auth [x]Yes    []No     Latex Allergy:  [x]NO      []YES  Preferred Language for Healthcare:   [x]English       []other:        SUBJECTIVE EXAMINATION     Pain level:   0-5/10  Location: see eval  SUBJECTIVE:  See eval      OBJECTIVE EXAMINATION     OBJECTIVE: see eval  Observation:   Test measurements:       Test used Initial score      Pain Summary VAS     Functional questionnaire      ROM                        Strength                            RESTRICTIONS/PRECAUTIONS: none    Exercises/Interventions:     Therapeutic Ex (50765)   Min: Activity specifics Notes/CUES   HEP and pt education on POC, expectations, hip and lumbar mechanics x22'                                                                                                             Manual Intervention
70.1

## 2023-08-10 ENCOUNTER — HOSPITAL ENCOUNTER (OUTPATIENT)
Dept: PHYSICAL THERAPY | Age: 48
Setting detail: THERAPIES SERIES
Discharge: HOME | End: 2023-08-10
Payer: COMMERCIAL

## 2023-08-10 NOTE — DISCHARGE SUMMARY
[facility], [address] [place] [address]  [address] [place] [address], [address] [place] [address], [address] [place] [address]        Physical Therapy    Patient Name:  Marilu Ramirez  :  1975   Date:  8/10/2023      Cancelled visits to date: Pt had to cancel his visits as his insurance will not cover an outpatient hospital setting.       For today's appointment patient:  [x]  Cancelled    Electronically signed by:  Dheeraj Bullock PT

## 2023-08-15 ENCOUNTER — APPOINTMENT (OUTPATIENT)
Dept: PHYSICAL THERAPY | Age: 48
End: 2023-08-15
Payer: COMMERCIAL

## 2023-08-17 ENCOUNTER — APPOINTMENT (OUTPATIENT)
Dept: PHYSICAL THERAPY | Age: 48
End: 2023-08-17
Payer: COMMERCIAL

## 2023-08-22 ENCOUNTER — APPOINTMENT (OUTPATIENT)
Dept: PHYSICAL THERAPY | Age: 48
End: 2023-08-22
Payer: COMMERCIAL

## 2023-08-24 ENCOUNTER — APPOINTMENT (OUTPATIENT)
Dept: PHYSICAL THERAPY | Age: 48
End: 2023-08-24
Payer: COMMERCIAL

## 2023-08-29 ENCOUNTER — APPOINTMENT (OUTPATIENT)
Dept: PHYSICAL THERAPY | Age: 48
End: 2023-08-29
Payer: COMMERCIAL

## 2023-08-31 ENCOUNTER — APPOINTMENT (OUTPATIENT)
Dept: PHYSICAL THERAPY | Age: 48
End: 2023-08-31
Payer: COMMERCIAL

## 2023-11-20 ENCOUNTER — OFFICE VISIT (OUTPATIENT)
Dept: PRIMARY CARE CLINIC | Age: 48
End: 2023-11-20
Payer: COMMERCIAL

## 2023-11-20 VITALS
TEMPERATURE: 98.4 F | OXYGEN SATURATION: 98 % | HEIGHT: 77 IN | RESPIRATION RATE: 18 BRPM | BODY MASS INDEX: 24.49 KG/M2 | HEART RATE: 66 BPM | WEIGHT: 207.4 LBS | SYSTOLIC BLOOD PRESSURE: 112 MMHG | DIASTOLIC BLOOD PRESSURE: 62 MMHG

## 2023-11-20 DIAGNOSIS — J06.9 VIRAL UPPER RESPIRATORY TRACT INFECTION: Primary | ICD-10-CM

## 2023-11-20 PROCEDURE — 99213 OFFICE O/P EST LOW 20 MIN: CPT | Performed by: STUDENT IN AN ORGANIZED HEALTH CARE EDUCATION/TRAINING PROGRAM

## 2023-11-20 RX ORDER — FLUTICASONE PROPIONATE 50 MCG
2 SPRAY, SUSPENSION (ML) NASAL DAILY
Qty: 48 G | Refills: 1 | Status: SHIPPED | OUTPATIENT
Start: 2023-11-20

## 2023-11-20 RX ORDER — CETIRIZINE HYDROCHLORIDE 10 MG/1
10 TABLET ORAL DAILY
Qty: 30 TABLET | Refills: 5 | Status: SHIPPED | OUTPATIENT
Start: 2023-11-20

## 2023-11-20 RX ORDER — GUAIFENESIN AND DEXTROMETHORPHAN HYDROBROMIDE 600; 30 MG/1; MG/1
1 TABLET, EXTENDED RELEASE ORAL 2 TIMES DAILY
Qty: 28 TABLET | Refills: 0 | Status: SHIPPED | OUTPATIENT
Start: 2023-11-20

## 2023-11-20 SDOH — HEALTH STABILITY: PHYSICAL HEALTH: ON AVERAGE, HOW MANY MINUTES DO YOU ENGAGE IN EXERCISE AT THIS LEVEL?: 150+ MIN

## 2023-11-20 SDOH — HEALTH STABILITY: PHYSICAL HEALTH: ON AVERAGE, HOW MANY DAYS PER WEEK DO YOU ENGAGE IN MODERATE TO STRENUOUS EXERCISE (LIKE A BRISK WALK)?: 5 DAYS

## 2023-11-20 ASSESSMENT — PATIENT HEALTH QUESTIONNAIRE - PHQ9
SUM OF ALL RESPONSES TO PHQ9 QUESTIONS 1 & 2: 0
1. LITTLE INTEREST OR PLEASURE IN DOING THINGS: 0
4. FEELING TIRED OR HAVING LITTLE ENERGY: 0
SUM OF ALL RESPONSES TO PHQ QUESTIONS 1-9: 0
2. FEELING DOWN, DEPRESSED OR HOPELESS: 0
SUM OF ALL RESPONSES TO PHQ QUESTIONS 1-9: 0
5. POOR APPETITE OR OVEREATING: 0
7. TROUBLE CONCENTRATING ON THINGS, SUCH AS READING THE NEWSPAPER OR WATCHING TELEVISION: 0
SUM OF ALL RESPONSES TO PHQ QUESTIONS 1-9: 0
8. MOVING OR SPEAKING SO SLOWLY THAT OTHER PEOPLE COULD HAVE NOTICED. OR THE OPPOSITE, BEING SO FIGETY OR RESTLESS THAT YOU HAVE BEEN MOVING AROUND A LOT MORE THAN USUAL: 0
SUM OF ALL RESPONSES TO PHQ QUESTIONS 1-9: 0
3. TROUBLE FALLING OR STAYING ASLEEP: 0
9. THOUGHTS THAT YOU WOULD BE BETTER OFF DEAD, OR OF HURTING YOURSELF: 0
6. FEELING BAD ABOUT YOURSELF - OR THAT YOU ARE A FAILURE OR HAVE LET YOURSELF OR YOUR FAMILY DOWN: 0

## 2023-11-20 ASSESSMENT — ANXIETY QUESTIONNAIRES
6. BECOMING EASILY ANNOYED OR IRRITABLE: 0
4. TROUBLE RELAXING: 0
2. NOT BEING ABLE TO STOP OR CONTROL WORRYING: 0
7. FEELING AFRAID AS IF SOMETHING AWFUL MIGHT HAPPEN: 0
5. BEING SO RESTLESS THAT IT IS HARD TO SIT STILL: 0
3. WORRYING TOO MUCH ABOUT DIFFERENT THINGS: 0
GAD7 TOTAL SCORE: 0
1. FEELING NERVOUS, ANXIOUS, OR ON EDGE: 0

## 2024-05-31 NOTE — PROGRESS NOTES
Left message to rtrn call Your information:  Name: Janet Leone  : 1958    Your instructions:    You are being discharged home. Please follow up with your PCP and take your medications as prescribed.     IF YOU EXPERIENCE ANY OF THE FOLLOWING SYMPTOMS, CHEST PAIN, SHORTNESS OF BREATH, COUGHING UP BLOOD OR BLOODY SPUTUM, STOMACH PAIN OR CRAMPING, DARK, TARRY STOOLS, LOSS OF APPETITE, GENERAL NOT FEELING WELL, SIGNS AND SYMPTOMS OF INFECTION LIKE FEVER AND OR CHILLS, PLEASE CALL DR NICOLE OR RETURN TO THE EMERGENCY ROOM.     What to do after you leave the hospital:    Recommended diet: regular diet and low sodium    Recommended activity: activity as tolerated        The following personal items were collected during your admission and were returned to you:    Belongings  Dental Appliances: Uppers, Lowers  Vision - Corrective Lenses: None  Hearing Aid: None  Clothing: At bedside, Undergarments, Pants, Footwear, Shirt  Jewelry: Necklace, Ring  Body Piercings Removed: No  Electronic Devices: Cell Phone  Weapons (Notify Protective Services/Security): None  Other Valuables: Purse  Home Medications: None  Valuables Given To: Patient  Provide Name(s) of Who Valuable(s) Were Given To: self  Patient approves for provider to speak to responsible person post operatively: Yes    Information obtained by:  By signing below, I understand that if any problems occur once I leave the hospital I am to contact PCP.  I understand and acknowledge receipt of the instructions indicated above.

## 2024-07-28 ASSESSMENT — PATIENT HEALTH QUESTIONNAIRE - PHQ9
2. FEELING DOWN, DEPRESSED OR HOPELESS: NOT AT ALL
SUM OF ALL RESPONSES TO PHQ9 QUESTIONS 1 & 2: 0
SUM OF ALL RESPONSES TO PHQ QUESTIONS 1-9: 0
SUM OF ALL RESPONSES TO PHQ QUESTIONS 1-9: 0
1. LITTLE INTEREST OR PLEASURE IN DOING THINGS: NOT AT ALL
SUM OF ALL RESPONSES TO PHQ QUESTIONS 1-9: 0
1. LITTLE INTEREST OR PLEASURE IN DOING THINGS: NOT AT ALL
2. FEELING DOWN, DEPRESSED OR HOPELESS: NOT AT ALL
SUM OF ALL RESPONSES TO PHQ9 QUESTIONS 1 & 2: 0
SUM OF ALL RESPONSES TO PHQ QUESTIONS 1-9: 0

## 2024-07-31 ENCOUNTER — OFFICE VISIT (OUTPATIENT)
Dept: PRIMARY CARE CLINIC | Age: 49
End: 2024-07-31
Payer: COMMERCIAL

## 2024-07-31 ENCOUNTER — HOSPITAL ENCOUNTER (OUTPATIENT)
Age: 49
Discharge: HOME | End: 2024-07-31
Payer: COMMERCIAL

## 2024-07-31 VITALS
HEIGHT: 77 IN | HEART RATE: 57 BPM | DIASTOLIC BLOOD PRESSURE: 75 MMHG | BODY MASS INDEX: 23.97 KG/M2 | TEMPERATURE: 97.7 F | WEIGHT: 203 LBS | SYSTOLIC BLOOD PRESSURE: 100 MMHG | OXYGEN SATURATION: 99 %

## 2024-07-31 DIAGNOSIS — Z00.00 ENCOUNTER FOR WELL ADULT EXAM WITHOUT ABNORMAL FINDINGS: Primary | ICD-10-CM

## 2024-07-31 DIAGNOSIS — Z00.00 HEALTH CARE MAINTENANCE: ICD-10-CM

## 2024-07-31 DIAGNOSIS — E78.00 PURE HYPERCHOLESTEROLEMIA: ICD-10-CM

## 2024-07-31 DIAGNOSIS — R05.3 CHRONIC COUGH: ICD-10-CM

## 2024-07-31 DIAGNOSIS — M25.551 PAIN OF RIGHT HIP: ICD-10-CM

## 2024-07-31 DIAGNOSIS — G89.29 CHRONIC RIGHT SHOULDER PAIN: ICD-10-CM

## 2024-07-31 DIAGNOSIS — M25.511 CHRONIC RIGHT SHOULDER PAIN: ICD-10-CM

## 2024-07-31 LAB
CHOLEST SERPL-MCNC: 200 MG/DL (ref 0–199)
EST. AVERAGE GLUCOSE BLD GHB EST-MCNC: 99.7 MG/DL
HBA1C MFR BLD: 5.1 %
HDLC SERPL-MCNC: 60 MG/DL (ref 40–60)
LDLC SERPL CALC-MCNC: 126 MG/DL
TRIGL SERPL-MCNC: 72 MG/DL (ref 0–150)
VLDLC SERPL CALC-MCNC: 14 MG/DL

## 2024-07-31 PROCEDURE — 83036 HEMOGLOBIN GLYCOSYLATED A1C: CPT

## 2024-07-31 PROCEDURE — 83695 ASSAY OF LIPOPROTEIN(A): CPT

## 2024-07-31 PROCEDURE — 36415 COLL VENOUS BLD VENIPUNCTURE: CPT

## 2024-07-31 PROCEDURE — 99396 PREV VISIT EST AGE 40-64: CPT

## 2024-07-31 PROCEDURE — 80061 LIPID PANEL: CPT

## 2024-07-31 ASSESSMENT — ENCOUNTER SYMPTOMS
NAUSEA: 0
CHOKING: 0
EYE DISCHARGE: 0
ABDOMINAL PAIN: 0
SINUS PAIN: 0
STRIDOR: 0
SORE THROAT: 0
SINUS PRESSURE: 0
FACIAL SWELLING: 0
DIARRHEA: 0
WHEEZING: 0
CHEST TIGHTNESS: 0
VOMITING: 0
BLOOD IN STOOL: 0
TROUBLE SWALLOWING: 0
RHINORRHEA: 0
APNEA: 0
COUGH: 1
BACK PAIN: 1
EYE ITCHING: 0
VOICE CHANGE: 0
SHORTNESS OF BREATH: 0
CONSTIPATION: 0

## 2024-07-31 NOTE — PROGRESS NOTES
[facility]                               Family and Community Medicine Residency Practice                                             [address], [address], [place] [id number]        Phone: [phone]    The following is written by a medical student, please see below for resident/attending attestation and plan.    Name:  Jose Guadalupe Lu  :    1975    Consultants:   Patient Care Team:  Ildefonso Gamez MD as PCP - General (Family Medicine)  Mamadou Klein MD as PCP - Empaneled Provider    Chief Complaint:     Jose Guadalupe Lu is a 49 y.o. male  who presents today for his yearly physical as well as with complaints of right hip pain and chest congestion.     HPI:     Patient is a 50 yo male with a history of chronic right hip pain, hyperlipidemia, senironueral hearing loss, and plantar fascitis presenting for yearly physical as well as right hip pain and chest congestion.     For the hip pain he has chronic pain that has been worse in the last 2 weeks. He sees a hip doctor who has recommended hip replacement surgery in the past and the patient feels he might be getting closer to that. The pain is aching in his right hip and and been worse as he has been more active in the last 2 weeks with softball starting. He has also been experiencing some pain in his right lower leg located along the musculature lateral to the tibia. The pain is throbbing, occurs after exercise, is worsened by sitting in the bucket seat of his car, and is improved by tylenol/advil and massage.     He is also concerned about chest congestion. Since he had an upper respiratory infection last November he has had a cough in the morning that is productive of clear mucus. The cough usually clears up by noon and has not been improved by OTC allergy medication. He has had no nighttime awakenings due to the cough and has not had any SOB, CP, rhinorrhea, or nasal congestion.     Otherwise the 
          Objective:   Physical Exam  Constitutional:       General: He is not in acute distress.     Appearance: Normal appearance.   HENT:      Head: Normocephalic and atraumatic.   Cardiovascular:      Rate and Rhythm: Normal rate and regular rhythm.      Pulses: Normal pulses.      Heart sounds: Normal heart sounds. No murmur heard.     No friction rub. No gallop.   Pulmonary:      Effort: Pulmonary effort is normal.      Breath sounds: Normal breath sounds. No wheezing, rhonchi or rales.   Skin:     General: Skin is warm and dry.      Capillary Refill: Capillary refill takes less than 2 seconds.   Neurological:      General: No focal deficit present.      Mental Status: He is alert.   Psychiatric:         Mood and Affect: Mood normal.         Behavior: Behavior normal.         Thought Content: Thought content normal.         Judgment: Judgment normal.         Assessment / Plan:     1. Encounter for well adult exam without abnormal findings  ***    2. Chronic cough  ***  - XR CHEST STANDARD (2 VW); Future    3. Pure hypercholesterolemia  ***  - Lipid Panel; Future  - LIPOPROTEIN A (LPA); Future    4. Health care maintenance  ***  - Lipid Panel; Future  - Hemoglobin A1C; Future  - LIPOPROTEIN A (LPA); Future        Health Maitenance: ***    Health care decision maker:  {health care decision maker allegra:70236}  Vot-ER:  {vo+ER allegra:25168}      {resident attestation to med student note:74553}    EMR Dragon/transcription disclaimer:  Much of this encounter note is electronic transcription/translation of spoken language to printed texts.  The electronic translation of spoken language may be erroneous, or at times, nonsensical words or phrases may be inadvertently transcribed.  Although I have reviewed the note for such errors, some may still exist.

## 2024-08-02 LAB — LPA SERPL-MCNC: 57 MG/DL

## 2024-08-15 NOTE — PROGRESS NOTES
Jose Guadalupe Lu   1975, 49 y.o. male   [id number]       Referring Provider: SELF  Referral Type:  SELF    Reason for Visit: Evaluation of suspected change in hearing, tinnitus, or balance.    ADULT AUDIOLOGIC EVALUATION      Jose Guadalupe Lu is a 49 y.o. male seen today, 8/16/2024 , for a recheck audiologic evaluation.  Patient was alone.    AUDIOLOGIC AND OTHER PERTINENT MEDICAL HISTORY:      Jose Guadalupe Lu noted tinnitus.  Patient was seen to update his hearing test.  He noted intermittent bilateral tinnitus.  Previous audiologic evaluation was completed on 10/06/2022.     Jose Guadalupe Lu denied otalgia, aural fullness, and dizziness.    Date: 8/16/2024     IMPRESSIONS:      Normal middle ear pressure and compliance, bilaterally.  Abnormal high frequency hearing sensitivity, bilaterally, which can affect difficult listening environments.  Word understanding was excellent presented at normal conversation level, bilaterally.  Slight decline in high frequency hearing, bilaterally.     ASSESSMENT AND FINDINGS:     Otoscopy revealed: Clear ear canals bilaterally    RIGHT EAR:  Hearing Sensitivity: Normal hearing sensitivity to a moderately severe sensorineural hearing loss from 250-8000 Hz  Speech Recognition Threshold: 15 dB HL  Word Recognition:Excellent (100%), based on NU-6 25-word list at 50 dBHL using recorded speech stimuli.    Tympanometry: Normal peak pressure and compliance, Type A tympanogram, consistent with normal middle ear function.      LEFT EAR:  Hearing Sensitivity: Normal hearing sensitivity to a moderately severe sensorineural hearing loss from 250-8000 Hz  Speech Recognition Threshold: 10 dB HL  Word Recognition: Excellent (100%), based on NU-6 25-word list at 50 dBHL using recorded speech stimuli.    Tympanometry: Normal peak pressure and compliance, Type A tympanogram, consistent with normal middle ear function.      Reliability: Good   Transducer: Supraural headphones      See scanned audiogram dated

## 2024-08-16 ENCOUNTER — PROCEDURE VISIT (OUTPATIENT)
Dept: AUDIOLOGY | Age: 49
End: 2024-08-16

## 2024-08-16 DIAGNOSIS — H93.13 TINNITUS, BILATERAL: ICD-10-CM

## 2024-08-16 DIAGNOSIS — H90.3 SENSORINEURAL HEARING LOSS, BILATERAL: Primary | ICD-10-CM

## 2024-11-07 ENCOUNTER — OFFICE VISIT (OUTPATIENT)
Dept: ORTHOPEDIC SURGERY | Age: 49
End: 2024-11-07
Payer: COMMERCIAL

## 2024-11-07 VITALS — HEIGHT: 77 IN | BODY MASS INDEX: 23.97 KG/M2 | WEIGHT: 203 LBS

## 2024-11-07 DIAGNOSIS — M25.851 FEMOROACETABULAR IMPINGEMENT OF RIGHT HIP: Primary | ICD-10-CM

## 2024-11-07 DIAGNOSIS — M25.551 PAIN OF RIGHT HIP: ICD-10-CM

## 2024-11-07 PROCEDURE — 99204 OFFICE O/P NEW MOD 45 MIN: CPT | Performed by: ORTHOPAEDIC SURGERY

## 2024-11-07 NOTE — PROGRESS NOTES
Plan:  Pertinent imaging was reviewed. The etiology, natural history, and treatment options for the disorder were discussed.  The roles of activity modification, antiinflammatory medicine, injections, bracing, physical therapy, and surgical interventions were all described to the patient and questions were answered.    We believe patient is a candidate for Non operative management, Conservative treatment, including the following:     Activity modification and exercise recommendations  Future option for a joint injection - RIGHT HIP  diagnostic/therapeutic  Future option for a right JERED, DAA, ROBOTIC ASSISTED      All the patient's questions were answered while in the clinic.  The patient is understanding of all instructions and agrees with the plan.         I spent 45 minutes on patient education and coordinating care today. This included time examining the patient, reviewing the patient's chart and relevant imaging, and chart documentation. This excluded any separately billed procedures.    Follow up in: No follow-ups on file.       Sincerely,    Vic Evans DO  Sports Medicine Fellow  [facility]        11/07/24  7:48 AM    Sincerely,    Sol Thakkar MD [facility]  Orthopaedic Surgeon - Hip Preservation & Sports Medicine   [facility]   [address] [place] [address], [address], [address]  Office: [phone]    11/07/24  2:23 PM    The encounter with Jose Guadaluperenee Lu was carried out by myself, Dr Thakkar, who personally examined the patient and reviewed the plan.      This dictation was performed with a verbal recognition program (DRAGON) and it was checked for errors.  It is possible that there are still dictated errors within this office note.  If so, please bring any errors to my attention for an addendum.  All efforts were made to ensure that this office note is accurate.

## 2024-11-12 ENCOUNTER — OFFICE VISIT (OUTPATIENT)
Dept: ORTHOPEDIC SURGERY | Age: 49
End: 2024-11-12
Payer: COMMERCIAL

## 2024-11-12 VITALS — WEIGHT: 203 LBS | HEIGHT: 77 IN | BODY MASS INDEX: 23.97 KG/M2

## 2024-11-12 DIAGNOSIS — M25.851 FEMOROACETABULAR IMPINGEMENT OF RIGHT HIP: Primary | ICD-10-CM

## 2024-11-12 PROCEDURE — 20611 DRAIN/INJ JOINT/BURSA W/US: CPT

## 2024-11-12 RX ORDER — ROPIVACAINE HYDROCHLORIDE 5 MG/ML
14 INJECTION, SOLUTION EPIDURAL; INFILTRATION; PERINEURAL ONCE
Status: COMPLETED | OUTPATIENT
Start: 2024-11-12 | End: 2024-11-12

## 2024-11-12 RX ORDER — METHYLPREDNISOLONE ACETATE 40 MG/ML
80 INJECTION, SUSPENSION INTRA-ARTICULAR; INTRALESIONAL; INTRAMUSCULAR; SOFT TISSUE ONCE
Status: COMPLETED | OUTPATIENT
Start: 2024-11-12 | End: 2024-11-12

## 2024-11-12 RX ADMIN — METHYLPREDNISOLONE ACETATE 80 MG: 40 INJECTION, SUSPENSION INTRA-ARTICULAR; INTRALESIONAL; INTRAMUSCULAR; SOFT TISSUE at 09:02

## 2024-11-12 RX ADMIN — ROPIVACAINE HYDROCHLORIDE 14 ML: 5 INJECTION, SOLUTION EPIDURAL; INFILTRATION; PERINEURAL at 09:03

## 2024-11-12 NOTE — PROGRESS NOTES
Date:  2024    Name:  Jose Guadalupe Lu  Address:  [address] [address]    :  1975      Age:   49 y.o.    SSN:  [id number]      Medical Record Number:  [id number]    Reason for Visit:    Chief Complaint    Hip Pain (RIGHT HIP US IAC INJECTION)      DOS:2024     HPI: Jose Guadalupe Lu is a 49 y.o. male here today for  prescheduled right hip IAC for OA.     He has ongoing anterior hip pain and stiffness worse with activity for the past 5 years. He wishes to proceed with hip injection today under ultrasound with no contraindications.  Pain assessment is documented below.       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 Referred by previous patient of ours.        Pain Assessment  Location of Pain: Hip  Location Modifiers: Right  Severity of Pain: 2  Quality of Pain: Throbbing  Frequency of Pain: Constant  Aggravating Factors: Other (Comment) (DRIVING/END OF DAY)  Limiting Behavior: Some  Relieving Factors: Nsaids  Result of Injury: No  Work-Related Injury: No  ROS: Review of systems reviewed from Patient History Form completed today and available in the patient's chart under the Media tab.       No past medical history on file.     Past Surgical History:   Procedure Laterality Date    COLONOSCOPY N/A 2022    COLONOSCOPY performed by Ashwin Manuel MD at [facility] ENDOSCOPY    KNEE SURGERY Right     staph infection    SHOULDER SURGERY Right     labrum repair       Family History   Problem Relation Age of Onset    Cancer Mother         skin    High Cholesterol Father     Stroke Maternal Grandmother     Stroke Maternal Grandfather         skin-unsure of type    High Cholesterol Paternal Grandmother        Social History     Socioeconomic History    Marital status:      Spouse name: None    Number of children: 3    Years of education: None    Highest education level: None   Occupational History    Occupation: teacher     Comment:

## 2024-12-17 ENCOUNTER — OFFICE VISIT (OUTPATIENT)
Dept: ORTHOPEDIC SURGERY | Age: 49
End: 2024-12-17
Payer: COMMERCIAL

## 2024-12-17 VITALS — WEIGHT: 203 LBS | HEIGHT: 77 IN | RESPIRATION RATE: 12 BRPM | BODY MASS INDEX: 23.97 KG/M2

## 2024-12-17 DIAGNOSIS — M16.11 PRIMARY OSTEOARTHRITIS OF RIGHT HIP: Primary | ICD-10-CM

## 2024-12-17 PROCEDURE — 99213 OFFICE O/P EST LOW 20 MIN: CPT

## 2024-12-17 NOTE — PROGRESS NOTES
agrees with the plan.         I spent 20 minutes on patient education and coordinating care today. This included time examining the patient, reviewing the patient's chart and relevant imaging, and chart documentation. This excluded any separately billed procedures.    Follow up in: Return for PRE-OP RIGHT HIP JERED .      Sincerely,  MONTY Acosta      12/17/24  9:40 AM       This dictation was performed with a verbal recognition program (DRAGON) and it was checked for errors.  It is possible that there are still dictated errors within this office note.  If so, please bring any errors to my attention for an addendum.  All efforts were made to ensure that this office note is accurate.

## 2025-01-10 ENCOUNTER — TELEPHONE (OUTPATIENT)
Dept: ORTHOPEDIC SURGERY | Age: 50
End: 2025-01-10

## 2025-01-10 NOTE — TELEPHONE ENCOUNTER
Surgery and/or Procedure Scheduling     Contact Name: JUSTUS IVY  Surgical/Procedure Request: RIGHT JERED  Patient Contact Number: [phone]

## 2025-01-13 NOTE — TELEPHONE ENCOUNTER
Patient called after staff had left the office on 01/10/2025.  He will be contacted by surgery scheduler on 01/14/2025.

## 2025-01-16 DIAGNOSIS — M16.11 PRIMARY OSTEOARTHRITIS OF RIGHT HIP: Primary | ICD-10-CM

## 2025-01-16 DIAGNOSIS — Z01.818 PREOPERATIVE CLEARANCE: ICD-10-CM

## 2025-01-20 ENCOUNTER — PREP FOR PROCEDURE (OUTPATIENT)
Dept: ORTHOPEDIC SURGERY | Age: 50
End: 2025-01-20

## 2025-01-20 DIAGNOSIS — M16.11 PRIMARY OSTEOARTHRITIS OF RIGHT HIP: ICD-10-CM

## 2025-02-24 ENCOUNTER — HOSPITAL ENCOUNTER (OUTPATIENT)
Dept: PHYSICAL THERAPY | Age: 50
Setting detail: THERAPIES SERIES
Discharge: HOME | End: 2025-02-24
Attending: ORTHOPAEDIC SURGERY
Payer: COMMERCIAL

## 2025-02-24 DIAGNOSIS — M16.11 PRIMARY OSTEOARTHRITIS OF RIGHT HIP: Primary | ICD-10-CM

## 2025-02-24 PROCEDURE — 97110 THERAPEUTIC EXERCISES: CPT | Performed by: PHYSICAL THERAPIST

## 2025-02-24 PROCEDURE — 97161 PT EVAL LOW COMPLEX 20 MIN: CPT | Performed by: PHYSICAL THERAPIST

## 2025-02-24 NOTE — PLAN OF CARE
[facility] - Outpatient Rehabilitation and Therapy: [address], [place] [address] office: [phone] fax: [phone]    Physical Therapy Initial Evaluation Certification      Dear Sol Thakkar MD,    We had the pleasure of evaluating the following patient for physical therapy services at [facility].  A summary of our findings can be found in the initial assessment below.  This includes our plan of care.  If you have any questions or concerns regarding these findings, please do not hesitate to contact me at the office phone number listed above.  Thank you for the referral.     Physician Signature:_______________________________Date:__________________  By signing above (or electronic signature), therapist’s plan is approved by physician       Physical Therapy: TREATMENT/PROGRESS NOTE   Patient: Jose Guadalupe Lu (50 y.o. male)   Examination Date: 2025   :  1975 MRN: [id number]   Visit #: 1   Insurance Allowable Auth Needed   $25    60pcy, but needs auth [x]Yes    []No    Insurance: Payor: [place] BCBS / Plan: [place] P[place] / Product Type: *No Product type* /   Insurance ID: [id number] - ([facility])  Secondary Insurance (if applicable):    Treatment Diagnosis:     ICD-10-CM    1. Primary osteoarthritis of right hip  M16.11          Medical Diagnosis:  Primary osteoarthritis of right hip [M16.11]   Referring Physician: Sol Thakkar MD  PCP: Ildefonso Gamez MD     Plan of care signed (Y/N):     Date of Patient follow up with Physician:      Plan of Care Report: EVAL today  POC update due: (10 visits /OR AUTH LIMITS, whichever is less)  3/26/2025                                             Medical History:  Comorbidities:  Osteoarthritis  Relevant Medical History: shoulder surgery                                          Precautions/ Contra-indications:           Latex allergy:  NO  Pacemaker:    NO  Contraindications

## 2025-03-01 SDOH — ECONOMIC STABILITY: INCOME INSECURITY: IN THE LAST 12 MONTHS, WAS THERE A TIME WHEN YOU WERE NOT ABLE TO PAY THE MORTGAGE OR RENT ON TIME?: NO

## 2025-03-01 SDOH — ECONOMIC STABILITY: FOOD INSECURITY: WITHIN THE PAST 12 MONTHS, YOU WORRIED THAT YOUR FOOD WOULD RUN OUT BEFORE YOU GOT MONEY TO BUY MORE.: NEVER TRUE

## 2025-03-01 SDOH — ECONOMIC STABILITY: FOOD INSECURITY: WITHIN THE PAST 12 MONTHS, THE FOOD YOU BOUGHT JUST DIDN'T LAST AND YOU DIDN'T HAVE MONEY TO GET MORE.: NEVER TRUE

## 2025-03-01 SDOH — ECONOMIC STABILITY: TRANSPORTATION INSECURITY
IN THE PAST 12 MONTHS, HAS LACK OF TRANSPORTATION KEPT YOU FROM MEETINGS, WORK, OR FROM GETTING THINGS NEEDED FOR DAILY LIVING?: NO

## 2025-03-01 SDOH — ECONOMIC STABILITY: TRANSPORTATION INSECURITY
IN THE PAST 12 MONTHS, HAS THE LACK OF TRANSPORTATION KEPT YOU FROM MEDICAL APPOINTMENTS OR FROM GETTING MEDICATIONS?: NO

## 2025-03-01 ASSESSMENT — PATIENT HEALTH QUESTIONNAIRE - PHQ9
2. FEELING DOWN, DEPRESSED OR HOPELESS: NOT AT ALL
SUM OF ALL RESPONSES TO PHQ QUESTIONS 1-9: 0
2. FEELING DOWN, DEPRESSED OR HOPELESS: NOT AT ALL
SUM OF ALL RESPONSES TO PHQ QUESTIONS 1-9: 0
SUM OF ALL RESPONSES TO PHQ9 QUESTIONS 1 & 2: 0
1. LITTLE INTEREST OR PLEASURE IN DOING THINGS: NOT AT ALL
1. LITTLE INTEREST OR PLEASURE IN DOING THINGS: NOT AT ALL

## 2025-03-04 ENCOUNTER — OFFICE VISIT (OUTPATIENT)
Dept: PRIMARY CARE CLINIC | Age: 50
End: 2025-03-04

## 2025-03-04 VITALS
HEART RATE: 69 BPM | WEIGHT: 214.2 LBS | TEMPERATURE: 98.3 F | BODY MASS INDEX: 24.78 KG/M2 | HEIGHT: 78 IN | DIASTOLIC BLOOD PRESSURE: 78 MMHG | RESPIRATION RATE: 18 BRPM | OXYGEN SATURATION: 97 % | SYSTOLIC BLOOD PRESSURE: 110 MMHG

## 2025-03-04 DIAGNOSIS — Z01.818 PREOPERATIVE CLEARANCE: ICD-10-CM

## 2025-03-04 DIAGNOSIS — Z01.818 PRE-OP EVALUATION: Primary | ICD-10-CM

## 2025-03-04 LAB
25(OH)D3 SERPL-MCNC: 49.4 NG/ML
ALBUMIN SERPL-MCNC: 4.4 G/DL (ref 3.4–5)
ALBUMIN/GLOB SERPL: 1.7 {RATIO} (ref 1.1–2.2)
ALP SERPL-CCNC: 54 U/L (ref 40–129)
ALT SERPL-CCNC: 39 U/L (ref 10–40)
ANION GAP SERPL CALCULATED.3IONS-SCNC: 9 MMOL/L (ref 3–16)
APTT BLD: 31.3 SEC (ref 22.1–36.4)
AST SERPL-CCNC: 28 U/L (ref 15–37)
BASOPHILS # BLD: 0 K/UL (ref 0–0.2)
BASOPHILS NFR BLD: 1 %
BILIRUB SERPL-MCNC: 0.3 MG/DL (ref 0–1)
BILIRUB UR QL STRIP.AUTO: NEGATIVE
BUN SERPL-MCNC: 12 MG/DL (ref 7–20)
CALCIUM SERPL-MCNC: 9.9 MG/DL (ref 8.3–10.6)
CHLORIDE SERPL-SCNC: 105 MMOL/L (ref 99–110)
CLARITY UR: CLEAR
CO2 SERPL-SCNC: 29 MMOL/L (ref 21–32)
COLOR UR: YELLOW
CREAT SERPL-MCNC: 1 MG/DL (ref 0.9–1.3)
DEPRECATED RDW RBC AUTO: 13.9 % (ref 12.4–15.4)
EOSINOPHIL # BLD: 0.1 K/UL (ref 0–0.6)
EOSINOPHIL NFR BLD: 1.7 %
EST. AVERAGE GLUCOSE BLD GHB EST-MCNC: 102.5 MG/DL
GFR SERPLBLD CREATININE-BSD FMLA CKD-EPI: >90 ML/MIN/{1.73_M2}
GLUCOSE SERPL-MCNC: 80 MG/DL (ref 70–99)
GLUCOSE UR STRIP.AUTO-MCNC: NEGATIVE MG/DL
HBA1C MFR BLD: 5.2 %
HCT VFR BLD AUTO: 44.6 % (ref 40.5–52.5)
HGB BLD-MCNC: 14.7 G/DL (ref 13.5–17.5)
HGB UR QL STRIP.AUTO: NEGATIVE
INR PPP: 1.06 (ref 0.85–1.15)
KETONES UR STRIP.AUTO-MCNC: NEGATIVE MG/DL
LEUKOCYTE ESTERASE UR QL STRIP.AUTO: NEGATIVE
LYMPHOCYTES # BLD: 1.4 K/UL (ref 1–5.1)
LYMPHOCYTES NFR BLD: 31.4 %
MCH RBC QN AUTO: 29.4 PG (ref 26–34)
MCHC RBC AUTO-ENTMCNC: 33 G/DL (ref 31–36)
MCV RBC AUTO: 89.2 FL (ref 80–100)
MONOCYTES # BLD: 0.4 K/UL (ref 0–1.3)
MONOCYTES NFR BLD: 9.3 %
NEUTROPHILS # BLD: 2.5 K/UL (ref 1.7–7.7)
NEUTROPHILS NFR BLD: 56.6 %
NITRITE UR QL STRIP.AUTO: NEGATIVE
PH UR STRIP.AUTO: 7.5 [PH] (ref 5–8)
PLATELET # BLD AUTO: 215 K/UL (ref 135–450)
PMV BLD AUTO: 8.8 FL (ref 5–10.5)
POTASSIUM SERPL-SCNC: 4.4 MMOL/L (ref 3.5–5.1)
PROT SERPL-MCNC: 7 G/DL (ref 6.4–8.2)
PROT UR STRIP.AUTO-MCNC: NEGATIVE MG/DL
PROTHROMBIN TIME: 14 SEC (ref 11.9–14.9)
RBC # BLD AUTO: 5 M/UL (ref 4.2–5.9)
SODIUM SERPL-SCNC: 143 MMOL/L (ref 136–145)
SP GR UR STRIP.AUTO: 1.02 (ref 1–1.03)
UA COMPLETE W REFLEX CULTURE PNL UR: NORMAL
UA DIPSTICK W REFLEX MICRO PNL UR: NORMAL
URN SPEC COLLECT METH UR: NORMAL
UROBILINOGEN UR STRIP-ACNC: 0.2 E.U./DL
WBC # BLD AUTO: 4.4 K/UL (ref 4–11)

## 2025-03-04 RX ORDER — DICLOFENAC SODIUM 75 MG/1
75 TABLET, DELAYED RELEASE ORAL 2 TIMES DAILY
COMMUNITY
Start: 2025-02-27

## 2025-03-04 NOTE — PROGRESS NOTES
[facility] Family and Community Medicine Residency [facility]                                  [address], [address], [place] [address]         Phone: [phone]      Jose Guadalupe Lu  YOB: 1975    Date of Service:  3/4/2025    There were no vitals filed for this visit.   Wt Readings from Last 2 Encounters:   12/17/24 92.1 kg (203 lb)   11/12/24 92.1 kg (203 lb)     BP Readings from Last 3 Encounters:   07/31/24 100/75   11/20/23 112/62   07/24/23 122/72        No chief complaint on file.    No Known Allergies  No outpatient medications have been marked as taking for the 3/4/25 encounter (Appointment) with Roland Tejeda MD.       This patient presents to the office today for a preoperative consultation at the request of surgeon, Sol Valenzuela , who plans on performing right total hip replacement on March 21 at [facility].  No acute complaints or concerns today.        Planned anesthesia: General   Known anesthesia problems: None   Bleeding risk: No recent or remote history of abnormal bleeding  Personal or FH of DVT/PE: No    Patient objection to receiving blood products: No    Patient Active Problem List   Diagnosis    Chronic pain of right knee    Adjustment insomnia    Pure hypercholesterolemia    Plantar fasciitis    Sensorineural hearing loss, bilateral    Right hip impingement syndrome    Primary osteoarthritis of right hip       No past medical history on file.  Past Surgical History:   Procedure Laterality Date    COLONOSCOPY N/A 04/13/2022    COLONOSCOPY performed by Ashwin Manuel MD at [facility] ENDOSCOPY    KNEE SURGERY Right     staph infection    SHOULDER SURGERY Right     labrum repair     Family History   Problem Relation Age of Onset    Cancer Mother         skin    High Cholesterol Father     Stroke Maternal Grandmother     Stroke Maternal Grandfather         skin-unsure of type    High Cholesterol Paternal

## 2025-03-05 LAB — MRSA DNA SPEC QL NAA+PROBE: NORMAL

## 2025-03-10 RX ORDER — SODIUM CHLORIDE 0.9 % (FLUSH) 0.9 %
5-40 SYRINGE (ML) INJECTION EVERY 12 HOURS SCHEDULED
Status: CANCELLED | OUTPATIENT
Start: 2025-03-21

## 2025-03-10 RX ORDER — OXYCODONE HCL 10 MG/1
10 TABLET, FILM COATED, EXTENDED RELEASE ORAL ONCE
Status: CANCELLED | OUTPATIENT
Start: 2025-03-21 | End: 2025-03-10

## 2025-03-10 RX ORDER — ACETAMINOPHEN 325 MG/1
1000 TABLET ORAL ONCE
Status: CANCELLED | OUTPATIENT
Start: 2025-03-21 | End: 2025-03-10

## 2025-03-10 RX ORDER — SODIUM CHLORIDE, SODIUM LACTATE, POTASSIUM CHLORIDE, CALCIUM CHLORIDE 600; 310; 30; 20 MG/100ML; MG/100ML; MG/100ML; MG/100ML
INJECTION, SOLUTION INTRAVENOUS CONTINUOUS
Status: CANCELLED | OUTPATIENT
Start: 2025-03-21

## 2025-03-10 RX ORDER — SODIUM CHLORIDE 9 MG/ML
INJECTION, SOLUTION INTRAVENOUS PRN
Status: CANCELLED | OUTPATIENT
Start: 2025-03-21

## 2025-03-10 RX ORDER — CELECOXIB 200 MG/1
400 CAPSULE ORAL ONCE
Status: CANCELLED | OUTPATIENT
Start: 2025-03-21 | End: 2025-03-10

## 2025-03-10 RX ORDER — SODIUM CHLORIDE 0.9 % (FLUSH) 0.9 %
5-40 SYRINGE (ML) INJECTION PRN
Status: CANCELLED | OUTPATIENT
Start: 2025-03-21

## 2025-03-10 RX ORDER — DEXAMETHASONE SODIUM PHOSPHATE 10 MG/ML
10 INJECTION, SOLUTION INTRAMUSCULAR; INTRAVENOUS ONCE
Status: CANCELLED | OUTPATIENT
Start: 2025-03-21 | End: 2025-03-10

## 2025-03-10 RX ORDER — GABAPENTIN 300 MG/1
300 CAPSULE ORAL ONCE
Status: CANCELLED | OUTPATIENT
Start: 2025-03-21 | End: 2025-03-10

## 2025-03-12 ENCOUNTER — TELEPHONE (OUTPATIENT)
Dept: ORTHOPEDIC SURGERY | Age: 50
End: 2025-03-12

## 2025-03-12 NOTE — TELEPHONE ENCOUNTER
Orthopedic Nurse Navigator Summary    Patient Name:  Jose Guadalupe Lu  Anticipated Date of Surgery:  03/21/25  Attended Pre-op Education Class:  Video sent to patient email 02/24/25 and resent 03/10/25  PCP: Ildefonso Gamez MD  Date of PCP visit for H&P: 03/04/25  Is patient in a Pain Management program:  Review of Medical history reveals history of: Hypercholesterolemia, Adjustment insomnia, Sensorineural hearing loss- bilateral    Critical Lab Values  - Hemoglobin (g/dL):  Date: 03/04/25 Value 14.7  - Hematocrit(%): Date: 03/04/25  Value 44.6  - HgbA1C:  Date: 03/04/25 Value 5.2  - Albumin:  Date:  03/04/25 Value 4.4  - BUN:  Date: 03/04/25  Value 12  - Creatinine:  Date: 03/04/25  Value 1.0    03/04/25 MRSA swab- negative    Coronary Artery Disease/HTN/CHF history: No  Does the patient see a Cardiologist: No  Date of most recent cardiac appt:  On any anticoagulation:  No    Diabetes History:  No  Most recent HgbA1C: 5.2  Pulmonary:  COPD/Emphysema/Use of home oxygen: No  Alcohol use: Yes    BMI greater than 40 at time of scheduling:  BMI 24.91  Additional medical concerns:  Additional recommendations for above concerns:  Attended Pre-Hab program:    Anticipated Discharge Disposition:  Home with OPT  Who will be with patient at home following discharge:  wife and children will be helping  Equipment patient already has:  crutches  Bedroom on first or second floor:  second  Bathroom on first or second floor:  both  Weight bearing status:  wbat  Pre-op ambulatory status: painful ambulation  Number of entry steps:  3  Caregiver assistance:  full time    Dana Ludwig RN  Date: 03/12/25

## 2025-03-13 ENCOUNTER — OFFICE VISIT (OUTPATIENT)
Dept: ORTHOPEDIC SURGERY | Age: 50
End: 2025-03-13
Payer: COMMERCIAL

## 2025-03-13 VITALS — HEIGHT: 77 IN | RESPIRATION RATE: 12 BRPM | BODY MASS INDEX: 25.27 KG/M2 | WEIGHT: 214 LBS

## 2025-03-13 DIAGNOSIS — M16.11 PRIMARY OSTEOARTHRITIS OF RIGHT HIP: Primary | ICD-10-CM

## 2025-03-13 PROCEDURE — 99214 OFFICE O/P EST MOD 30 MIN: CPT | Performed by: ORTHOPAEDIC SURGERY

## 2025-03-13 RX ORDER — HYDROCODONE BITARTRATE AND ACETAMINOPHEN 5; 325 MG/1; MG/1
1 TABLET ORAL EVERY 4 HOURS PRN
Qty: 20 TABLET | Refills: 0 | Status: SHIPPED | OUTPATIENT
Start: 2025-03-13 | End: 2025-03-18

## 2025-03-13 RX ORDER — CEPHALEXIN 500 MG/1
500 CAPSULE ORAL 4 TIMES DAILY
Qty: 12 CAPSULE | Refills: 0 | Status: SHIPPED | OUTPATIENT
Start: 2025-03-13 | End: 2025-03-16

## 2025-03-13 RX ORDER — SENNOSIDES 8.6 MG
1 TABLET ORAL 2 TIMES DAILY
Qty: 14 TABLET | Refills: 0 | Status: SHIPPED | OUTPATIENT
Start: 2025-03-13 | End: 2025-03-20

## 2025-03-13 RX ORDER — CYCLOBENZAPRINE HCL 10 MG
10 TABLET ORAL 3 TIMES DAILY PRN
Qty: 21 TABLET | Refills: 0 | Status: SHIPPED | OUTPATIENT
Start: 2025-03-13 | End: 2025-03-20

## 2025-03-13 RX ORDER — NAPROXEN 500 MG/1
500 TABLET ORAL 2 TIMES DAILY WITH MEALS
Qty: 28 TABLET | Refills: 0 | Status: SHIPPED | OUTPATIENT
Start: 2025-03-13 | End: 2025-03-27

## 2025-03-13 RX ORDER — ASPIRIN 81 MG/1
81 TABLET ORAL 2 TIMES DAILY
Qty: 56 TABLET | Refills: 0 | Status: SHIPPED | OUTPATIENT
Start: 2025-03-13 | End: 2025-04-10

## 2025-03-13 NOTE — PROGRESS NOTES
Chief Complaint  Hip Pain (Pre-op right JERED 3/21/25)      History of Present Illness:  Jose Guadalupe uL is a pleasant 50 y.o. male here for pre op visit right jered, next week. DAA robotics    No setbacks. Worsening right hip stiffness. Underwent pre op blood work.     Medical History:  Patient's medications, allergies, past medical, surgical, social and family histories were reviewed and updated as appropriate.    Pain Assessment  Location of Pain: Hip  Location Modifiers: Right  Severity of Pain: 2  Quality of Pain: Throbbing  Duration of Pain: Persistent  Frequency of Pain: Intermittent  Aggravating Factors: Walking  Limiting Behavior: Yes  Relieving Factors: Ice, Nsaids  Result of Injury: No  Work-Related Injury: No  Are there other pain locations you wish to document?: No  ROS: Review of systems reviewed from Patient History Form completed today and available in the patient's chart under the Media tab.      Pertinent items are noted in HPI  Review of systems reviewed from Patient History Form completed today and available in the patient's chart under the Media tab.       Vital Signs:  Resp 12   Ht 1.956 m (6' 5\")   Wt 97.1 kg (214 lb)   BMI 25.38 kg/m²         Neuro: Alert & oriented x 3,  normal,  no focal deficits noted. Normal affect.  Eyes: sclera clear  Ears: Normal external ear  Mouth:  No perioral lesions  Pulm: Respirations unlabored and regular  Pulse: Extremities well perfused. 2+ peripheral pulses.  Skin: Warm. No ulcerations.      Constitutional: The physical examination finds the patient to be well-developed and well-nourished.  The patient is alert and oriented x3 and was cooperative throughout the visit.    Hip Examination: right    Skin/Inspection: No skin lesions, cellulitis, or extreme edema in the lower extremities.     Standing/Walking: normal gait, negative Trendelenburg sign.      Supine/Side Lying Exam: Non tender around the major bony prominences  diminished range with pain  FADIR

## 2025-03-14 ENCOUNTER — PATIENT MESSAGE (OUTPATIENT)
Dept: ORTHOPEDIC SURGERY | Age: 50
End: 2025-03-14

## 2025-03-14 ENCOUNTER — TELEPHONE (OUTPATIENT)
Dept: PRIMARY CARE CLINIC | Age: 50
End: 2025-03-14

## 2025-03-14 NOTE — PROGRESS NOTES
Called PCP office as H&P stated EKG was complete but no records of tracing in Epic system since 2012 to view. Spoke with Aiyana who discussed with , confirmed EKG was completed but did not save to Epic. Aiyana with PCP office is going to call patient and get him scheduled to comeback to PCP office for EKG completion.  -AS

## 2025-03-14 NOTE — TELEPHONE ENCOUNTER
[facility] Pre Admission is calling and stating that patient was in on 3/4/25 for a pre op and they are needing the EKG that was preformed in office per office note. Left VM for patient to call the office to schedule an appointment to have a EKG done before his surgery on 3/21/25.

## 2025-03-17 ENCOUNTER — OFFICE VISIT (OUTPATIENT)
Dept: PRIMARY CARE CLINIC | Age: 50
End: 2025-03-17

## 2025-03-17 VITALS
OXYGEN SATURATION: 99 % | RESPIRATION RATE: 16 BRPM | TEMPERATURE: 97.7 F | HEIGHT: 78 IN | BODY MASS INDEX: 24.48 KG/M2 | DIASTOLIC BLOOD PRESSURE: 70 MMHG | SYSTOLIC BLOOD PRESSURE: 110 MMHG | WEIGHT: 211.6 LBS | HEART RATE: 64 BPM

## 2025-03-17 DIAGNOSIS — Z01.818 PREOP EXAMINATION: Primary | ICD-10-CM

## 2025-03-17 RX ORDER — CYCLOBENZAPRINE HCL 10 MG
10 TABLET ORAL 3 TIMES DAILY PRN
Qty: 21 TABLET | Refills: 0 | Status: SHIPPED | OUTPATIENT
Start: 2025-03-17 | End: 2025-03-24

## 2025-03-17 NOTE — PROGRESS NOTES
PROGRESS NOTE   [facility] Family and Community Medicine Residency Practice                                  [address], [address], [place] [address]         Phone: [phone]    Date of Service:  3/17/2025     Patient ID: Diony Lu is a 50 y.o. male      Subjective:     CC: Repeat EKG    HPI  Patient is 50 year-old male who presents for pre-operative evaluation with repeat EKG. Patient presented to the clinic on 3/4 and had pre-operative evaluation for a total hip replacement planned on 3/21. Patient had EKG done but results of EKG were not saved into chart and he is needing repeat EKG done prior to hip replacement. Patient denies any changes in health. He denies any chest pain or shortness of breath.    ROS:    Constitutional:  Negative for activity or appetite change, fever or fatigue  HENT:  Negative for congestion, sinus pressure, or rhinorrhea  Eyes:  Negative for eye pain or visual changes  Resp:  Negative for SOB, chest tightness, cough  Cardiovascular: Negative for CP, palpitations, SANTOYO, orthopnea, PND, LE edema  Gastrointestinal: Negative for abd pain, melena, BRBPR, N/V/D  Endocrine:  Negative for polydipsia and polyuria  :  Negative for dysuria, flank pain or urinary frequency  Musculoskeletal:  Negative for back pain or myalgias  Neuro:  Negative for dizziness or lightheadedness  Psych: negative for depression or anxiety        Vitals:    03/17/25 1624   BP: 110/70   BP Site: Left Upper Arm   Patient Position: Sitting   BP Cuff Size: Large Adult   Pulse: 64   Resp: 16   Temp: 97.7 °F (36.5 °C)   TempSrc: Oral   SpO2: 99%   Weight: 96 kg (211 lb 9.6 oz)   Height: 1.981 m (6' 6\")       Allergies:  Patient has no known allergies.    No outpatient medications have been marked as taking for the 3/17/25 encounter (Office Visit) with Ildefonso Gamez MD.         Objective:   Constitutional:   Reviewed vitals above  Well Nourished, well developed, no distress

## 2025-03-17 NOTE — PROGRESS NOTES
[facility] PRE-SURGICAL TESTING INSTRUCTIONS                      PRIOR TO PROCEDURE DATE:    1. PLEASE FOLLOW ANY INSTRUCTIONS GIVEN TO YOU PER YOUR SURGEON.      2. Arrange for someone to drive you home and be with you for the first 24 hours after discharge for your safety after your procedure for which you received sedation. Ensure it is someone we can share information with regarding your discharge.     NOTE: At this time ONLY 2 ADULTS may accompany you   One person ENCOURAGED to stay at hospital entire time if outpatient surgery      3. You must contact your surgeon for instructions IF:  You are taking any blood thinners, aspirin, anti-inflammatory or vitamins.  There is a change in your physical condition such as a cold, fever, rash, cuts, sores, or any other infection, especially near your surgical site.    4. Do not drink alcohol the day before or day of your procedure.  Do not use any recreational marijuana at least 24 hours or street drugs (heroin, cocaine) at minimum 5 days prior to your procedure.     5. A Pre-Surgical History and Physical MUST be completed WITHIN 30 DAYS OR LESS prior to your procedure.by your Physician or an Urgent Care        THE DAY OF YOUR PROCEDURE:  1.  Follow instructions for ARRIVAL TIME as DIRECTED BY YOUR SURGEON.     2. Enter the MAIN entrance from [facility] and follow the signs to the free Parking Garage or  Parking (offered free of charge 7 am-5pm).      3. Enter the Main Entrance of the hospital (do not enter from the lower level of the parking garage). Upon entrance, check in with the  at the surgical information desk on your LEFT.   Bring your insurance card and photo ID to register      4. DO NOT EAT ANYTHING 8 hours prior to arrival for surgery.  You may have up to 8 ounces of water 4 hours prior to your arrival for surgery.   NOTE: ALL Gastric, Bariatric & Bowel surgery patients - you MUST follow your surgeon's instructions regarding

## 2025-03-17 NOTE — PROGRESS NOTES
Total Joint Same Day Readiness Screen 2.0  PAT Questionnaire    Does patient have at least one day of 24 hr assist of capable caregiver at d/c?  [x] Yes = 0  [] No = 6    Was patient using an assistive device to walk prior to surgery?  [x] No = 0  [] Yes = 4    How many steps do you have to get to the floor where you plan to initially sleep and use the restroom? (At least 1/2 bath)  [] 0-2 steps= 0 [x] 3+ steps = 1    Has patient fallen in the last 3 months? If yes, how many times?  [x] 0 falls = 0 [] 1+ fall = 1    [] 2+ falls = 4    Does patient have a hx of post-op nausea/vomiting?  [x] No = 0 [] Yes = 2    Other Factors    Age  [x] <70 = 0 [] 71-79 = 1  [] 80+ = 2                     BMI  [x] <30 = 0       []31-39 = 1    [] >40 = 2    Pertinent co-morbidities (consider cardiopulmonary, cardiovascular, neurological, and psychiatric diagnoses)  [x] 0 = 0           [] 1-2 = 2       [] 3+ = 4    Sleep apnea  [x] No = 0         [] Yes = 1    Hx of prolonged emergence from general anesthesia  [x] No = 0         [] Yes = 3      Score:  1      Interpretation:  Red (10-29): Low probability of safe same day discharge  Yellow (6-9): Moderate probability of safe same day discharge  Green (0-5): High probability of safe same day discharge    Score completed by:  Eula Bansal, PT #[id number]

## 2025-03-17 NOTE — PROGRESS NOTES
3/17 @ 6639 Pt confirms going to PCP Dr. Dykes & Dr. Escobedo [phone] to have EKG repeated this afternoon 3/17 at 4pm since \"it was not saved in EPIC on 3/4\". Total Joint video emailed 2/24 & 3/10 per Dana & reviewed to patient by Dana HOWE on 3/12. [facility] to [facility] x 5 days preop.  JONA screening done. TJ book, IS instructions, TJ video link, and fall contract placed on chart for DOS.TI Complete. MD

## 2025-03-20 ENCOUNTER — ANESTHESIA EVENT (OUTPATIENT)
Dept: OPERATING ROOM | Age: 50
End: 2025-03-20
Payer: COMMERCIAL

## 2025-03-20 RX ORDER — SODIUM CHLORIDE 9 MG/ML
INJECTION, SOLUTION INTRAVENOUS PRN
Status: CANCELLED | OUTPATIENT
Start: 2025-03-20

## 2025-03-20 RX ORDER — ONDANSETRON 2 MG/ML
4 INJECTION INTRAMUSCULAR; INTRAVENOUS EVERY 6 HOURS PRN
Status: CANCELLED | OUTPATIENT
Start: 2025-03-20

## 2025-03-20 RX ORDER — OXYCODONE HYDROCHLORIDE 5 MG/1
5 TABLET ORAL EVERY 4 HOURS PRN
Refills: 0 | Status: CANCELLED | OUTPATIENT
Start: 2025-03-20

## 2025-03-20 RX ORDER — SENNA AND DOCUSATE SODIUM 50; 8.6 MG/1; MG/1
1 TABLET, FILM COATED ORAL 2 TIMES DAILY
Status: CANCELLED | OUTPATIENT
Start: 2025-03-20

## 2025-03-20 RX ORDER — CYCLOBENZAPRINE HCL 10 MG
10 TABLET ORAL 3 TIMES DAILY PRN
Status: CANCELLED | OUTPATIENT
Start: 2025-03-20

## 2025-03-20 RX ORDER — ACETAMINOPHEN 325 MG/1
650 TABLET ORAL EVERY 6 HOURS
Status: CANCELLED | OUTPATIENT
Start: 2025-03-20

## 2025-03-20 RX ORDER — OXYCODONE HYDROCHLORIDE 5 MG/1
10 TABLET ORAL EVERY 4 HOURS PRN
Refills: 0 | Status: CANCELLED | OUTPATIENT
Start: 2025-03-20

## 2025-03-20 RX ORDER — SODIUM CHLORIDE 0.9 % (FLUSH) 0.9 %
5-40 SYRINGE (ML) INJECTION EVERY 12 HOURS SCHEDULED
Status: CANCELLED | OUTPATIENT
Start: 2025-03-20

## 2025-03-20 RX ORDER — ONDANSETRON 4 MG/1
4 TABLET, ORALLY DISINTEGRATING ORAL EVERY 8 HOURS PRN
Status: CANCELLED | OUTPATIENT
Start: 2025-03-20

## 2025-03-20 RX ORDER — SODIUM CHLORIDE 0.9 % (FLUSH) 0.9 %
5-40 SYRINGE (ML) INJECTION PRN
Status: CANCELLED | OUTPATIENT
Start: 2025-03-20

## 2025-03-20 RX ORDER — MORPHINE SULFATE 4 MG/ML
2 INJECTION INTRAVENOUS
Refills: 0 | Status: CANCELLED | OUTPATIENT
Start: 2025-03-20

## 2025-03-20 RX ORDER — ASPIRIN 81 MG/1
81 TABLET ORAL 2 TIMES DAILY
Status: CANCELLED | OUTPATIENT
Start: 2025-03-22 | End: 2025-04-19

## 2025-03-20 RX ORDER — SODIUM CHLORIDE 9 MG/ML
INJECTION, SOLUTION INTRAVENOUS CONTINUOUS
Status: CANCELLED | OUTPATIENT
Start: 2025-03-20

## 2025-03-20 RX ORDER — NAPROXEN 250 MG/1
500 TABLET ORAL 2 TIMES DAILY WITH MEALS
Status: CANCELLED | OUTPATIENT
Start: 2025-03-22 | End: 2025-04-05

## 2025-03-20 RX ORDER — MORPHINE SULFATE 4 MG/ML
4 INJECTION INTRAVENOUS
Refills: 0 | Status: CANCELLED | OUTPATIENT
Start: 2025-03-20

## 2025-03-21 ENCOUNTER — APPOINTMENT (OUTPATIENT)
Dept: GENERAL RADIOLOGY | Age: 50
End: 2025-03-21
Attending: ORTHOPAEDIC SURGERY
Payer: COMMERCIAL

## 2025-03-21 ENCOUNTER — ANESTHESIA (OUTPATIENT)
Dept: OPERATING ROOM | Age: 50
End: 2025-03-21
Payer: COMMERCIAL

## 2025-03-21 ENCOUNTER — HOSPITAL ENCOUNTER (OUTPATIENT)
Age: 50
Setting detail: OUTPATIENT SURGERY
Discharge: HOME | End: 2025-03-21
Attending: ORTHOPAEDIC SURGERY | Admitting: ORTHOPAEDIC SURGERY
Payer: COMMERCIAL

## 2025-03-21 VITALS
OXYGEN SATURATION: 93 % | TEMPERATURE: 97.2 F | WEIGHT: 210 LBS | RESPIRATION RATE: 16 BRPM | HEART RATE: 58 BPM | BODY MASS INDEX: 24.3 KG/M2 | HEIGHT: 78 IN | DIASTOLIC BLOOD PRESSURE: 65 MMHG | SYSTOLIC BLOOD PRESSURE: 121 MMHG

## 2025-03-21 LAB
ABO/RH: NORMAL
ANION GAP SERPL CALCULATED.3IONS-SCNC: 8 MMOL/L (ref 3–16)
ANTIBODY SCREEN: NORMAL
BUN SERPL-MCNC: 17 MG/DL (ref 7–20)
CALCIUM SERPL-MCNC: 8.7 MG/DL (ref 8.3–10.6)
CHLORIDE SERPL-SCNC: 106 MMOL/L (ref 99–110)
CO2 SERPL-SCNC: 24 MMOL/L (ref 21–32)
CREAT SERPL-MCNC: 1 MG/DL (ref 0.9–1.3)
GFR SERPLBLD CREATININE-BSD FMLA CKD-EPI: >90 ML/MIN/{1.73_M2}
GLUCOSE SERPL-MCNC: 118 MG/DL (ref 70–99)
HCT VFR BLD AUTO: 43.7 % (ref 40.5–52.5)
HGB BLD-MCNC: 14 G/DL (ref 13.5–17.5)
POTASSIUM SERPL-SCNC: 4.8 MMOL/L (ref 3.5–5.1)
SODIUM SERPL-SCNC: 138 MMOL/L (ref 136–145)

## 2025-03-21 PROCEDURE — 2500000003 HC RX 250 WO HCPCS: Performed by: ORTHOPAEDIC SURGERY

## 2025-03-21 PROCEDURE — 97530 THERAPEUTIC ACTIVITIES: CPT

## 2025-03-21 PROCEDURE — 85014 HEMATOCRIT: CPT

## 2025-03-21 PROCEDURE — 6360000002 HC RX W HCPCS: Performed by: ANESTHESIOLOGY

## 2025-03-21 PROCEDURE — 2580000003 HC RX 258: Performed by: ORTHOPAEDIC SURGERY

## 2025-03-21 PROCEDURE — 7100000011 HC PHASE II RECOVERY - ADDTL 15 MIN: Performed by: ORTHOPAEDIC SURGERY

## 2025-03-21 PROCEDURE — 2500000003 HC RX 250 WO HCPCS

## 2025-03-21 PROCEDURE — 7100000000 HC PACU RECOVERY - FIRST 15 MIN: Performed by: ORTHOPAEDIC SURGERY

## 2025-03-21 PROCEDURE — 86901 BLOOD TYPING SEROLOGIC RH(D): CPT

## 2025-03-21 PROCEDURE — 3700000000 HC ANESTHESIA ATTENDED CARE: Performed by: ORTHOPAEDIC SURGERY

## 2025-03-21 PROCEDURE — 86900 BLOOD TYPING SEROLOGIC ABO: CPT

## 2025-03-21 PROCEDURE — 80048 BASIC METABOLIC PNL TOTAL CA: CPT

## 2025-03-21 PROCEDURE — 6370000000 HC RX 637 (ALT 250 FOR IP): Performed by: ANESTHESIOLOGY

## 2025-03-21 PROCEDURE — 2709999900 HC NON-CHARGEABLE SUPPLY: Performed by: ORTHOPAEDIC SURGERY

## 2025-03-21 PROCEDURE — 73501 X-RAY EXAM HIP UNI 1 VIEW: CPT

## 2025-03-21 PROCEDURE — 6360000002 HC RX W HCPCS: Performed by: ORTHOPAEDIC SURGERY

## 2025-03-21 PROCEDURE — 3600000004 HC SURGERY LEVEL 4 BASE: Performed by: ORTHOPAEDIC SURGERY

## 2025-03-21 PROCEDURE — 97162 PT EVAL MOD COMPLEX 30 MIN: CPT

## 2025-03-21 PROCEDURE — 97535 SELF CARE MNGMENT TRAINING: CPT

## 2025-03-21 PROCEDURE — 86850 RBC ANTIBODY SCREEN: CPT

## 2025-03-21 PROCEDURE — 64447 NJX AA&/STRD FEMORAL NRV IMG: CPT | Performed by: ANESTHESIOLOGY

## 2025-03-21 PROCEDURE — 3600000014 HC SURGERY LEVEL 4 ADDTL 15MIN: Performed by: ORTHOPAEDIC SURGERY

## 2025-03-21 PROCEDURE — 2720000010 HC SURG SUPPLY STERILE: Performed by: ORTHOPAEDIC SURGERY

## 2025-03-21 PROCEDURE — 3700000001 HC ADD 15 MINUTES (ANESTHESIA): Performed by: ORTHOPAEDIC SURGERY

## 2025-03-21 PROCEDURE — 97116 GAIT TRAINING THERAPY: CPT

## 2025-03-21 PROCEDURE — 6360000002 HC RX W HCPCS

## 2025-03-21 PROCEDURE — 73502 X-RAY EXAM HIP UNI 2-3 VIEWS: CPT

## 2025-03-21 PROCEDURE — 6370000000 HC RX 637 (ALT 250 FOR IP)

## 2025-03-21 PROCEDURE — 7100000010 HC PHASE II RECOVERY - FIRST 15 MIN: Performed by: ORTHOPAEDIC SURGERY

## 2025-03-21 PROCEDURE — 7100000001 HC PACU RECOVERY - ADDTL 15 MIN: Performed by: ORTHOPAEDIC SURGERY

## 2025-03-21 PROCEDURE — C1776 JOINT DEVICE (IMPLANTABLE): HCPCS | Performed by: ORTHOPAEDIC SURGERY

## 2025-03-21 PROCEDURE — 97166 OT EVAL MOD COMPLEX 45 MIN: CPT

## 2025-03-21 PROCEDURE — 85018 HEMOGLOBIN: CPT

## 2025-03-21 PROCEDURE — 6370000000 HC RX 637 (ALT 250 FOR IP): Performed by: ORTHOPAEDIC SURGERY

## 2025-03-21 DEVICE — HEAD FEM DIA40MM +0MM OFFSET UNIV HIP CERAMIC BIOLOX DELT: Type: IMPLANTABLE DEVICE | Site: HIP | Status: FUNCTIONAL

## 2025-03-21 DEVICE — SHELL ACET SZ F DIA58MM 5 CLUS H TRITANIUM PRESSFIT PRI: Type: IMPLANTABLE DEVICE | Site: HIP | Status: FUNCTIONAL

## 2025-03-21 DEVICE — IMPLANTABLE DEVICE: Type: IMPLANTABLE DEVICE | Site: HIP | Status: FUNCTIONAL

## 2025-03-21 DEVICE — SCREW BNE L30MM DIA6.5MM STD CANC HIP TI HMSPHR THRD DRVR: Type: IMPLANTABLE DEVICE | Site: HIP | Status: FUNCTIONAL

## 2025-03-21 DEVICE — COMPONENT TOT HIP CAPPED LNR POLYETH H2STRYKER] STRYKER CORP]: Type: IMPLANTABLE DEVICE | Site: HIP | Status: FUNCTIONAL

## 2025-03-21 DEVICE — ADAPTER SL +0MM OFFSET UNIV HIP TI V40 TAPR ACCOLADE II: Type: IMPLANTABLE DEVICE | Site: HIP | Status: FUNCTIONAL

## 2025-03-21 RX ORDER — SODIUM CHLORIDE, SODIUM LACTATE, POTASSIUM CHLORIDE, CALCIUM CHLORIDE 600; 310; 30; 20 MG/100ML; MG/100ML; MG/100ML; MG/100ML
INJECTION, SOLUTION INTRAVENOUS CONTINUOUS
Status: DISCONTINUED | OUTPATIENT
Start: 2025-03-21 | End: 2025-03-21 | Stop reason: HOSPADM

## 2025-03-21 RX ORDER — METHOCARBAMOL 100 MG/ML
INJECTION, SOLUTION INTRAMUSCULAR; INTRAVENOUS
Status: DISCONTINUED | OUTPATIENT
Start: 2025-03-21 | End: 2025-03-21 | Stop reason: SDUPTHER

## 2025-03-21 RX ORDER — ONDANSETRON 2 MG/ML
INJECTION INTRAMUSCULAR; INTRAVENOUS
Status: DISCONTINUED | OUTPATIENT
Start: 2025-03-21 | End: 2025-03-21 | Stop reason: SDUPTHER

## 2025-03-21 RX ORDER — ROCURONIUM BROMIDE 10 MG/ML
INJECTION, SOLUTION INTRAVENOUS
Status: DISCONTINUED | OUTPATIENT
Start: 2025-03-21 | End: 2025-03-21 | Stop reason: SDUPTHER

## 2025-03-21 RX ORDER — GABAPENTIN 300 MG/1
300 CAPSULE ORAL ONCE
Status: COMPLETED | OUTPATIENT
Start: 2025-03-21 | End: 2025-03-21

## 2025-03-21 RX ORDER — ROPIVACAINE HYDROCHLORIDE 5 MG/ML
INJECTION, SOLUTION EPIDURAL; INFILTRATION; PERINEURAL
Status: COMPLETED | OUTPATIENT
Start: 2025-03-21 | End: 2025-03-21

## 2025-03-21 RX ORDER — SODIUM CHLORIDE 0.9 % (FLUSH) 0.9 %
5-40 SYRINGE (ML) INJECTION PRN
Status: DISCONTINUED | OUTPATIENT
Start: 2025-03-21 | End: 2025-03-21 | Stop reason: HOSPADM

## 2025-03-21 RX ORDER — HYDRALAZINE HYDROCHLORIDE 20 MG/ML
10 INJECTION INTRAMUSCULAR; INTRAVENOUS
Status: DISCONTINUED | OUTPATIENT
Start: 2025-03-21 | End: 2025-03-21 | Stop reason: HOSPADM

## 2025-03-21 RX ORDER — METOCLOPRAMIDE HYDROCHLORIDE 5 MG/ML
10 INJECTION INTRAMUSCULAR; INTRAVENOUS
Status: COMPLETED | OUTPATIENT
Start: 2025-03-21 | End: 2025-03-21

## 2025-03-21 RX ORDER — MAGNESIUM HYDROXIDE 1200 MG/15ML
LIQUID ORAL CONTINUOUS PRN
Status: DISCONTINUED | OUTPATIENT
Start: 2025-03-21 | End: 2025-03-21 | Stop reason: HOSPADM

## 2025-03-21 RX ORDER — NALOXONE HYDROCHLORIDE 0.4 MG/ML
INJECTION, SOLUTION INTRAMUSCULAR; INTRAVENOUS; SUBCUTANEOUS PRN
Status: DISCONTINUED | OUTPATIENT
Start: 2025-03-21 | End: 2025-03-21 | Stop reason: HOSPADM

## 2025-03-21 RX ORDER — ACETAMINOPHEN 325 MG/1
1000 TABLET ORAL ONCE
Status: COMPLETED | OUTPATIENT
Start: 2025-03-21 | End: 2025-03-21

## 2025-03-21 RX ORDER — MEPERIDINE HYDROCHLORIDE 25 MG/ML
12.5 INJECTION INTRAMUSCULAR; INTRAVENOUS; SUBCUTANEOUS EVERY 5 MIN PRN
Status: DISCONTINUED | OUTPATIENT
Start: 2025-03-21 | End: 2025-03-21 | Stop reason: HOSPADM

## 2025-03-21 RX ORDER — PHENYLEPHRINE HCL IN 0.9% NACL 1 MG/10 ML
SYRINGE (ML) INTRAVENOUS
Status: DISCONTINUED | OUTPATIENT
Start: 2025-03-21 | End: 2025-03-21 | Stop reason: SDUPTHER

## 2025-03-21 RX ORDER — OXYCODONE HYDROCHLORIDE 5 MG/1
5 TABLET ORAL
Refills: 0 | Status: DISCONTINUED | OUTPATIENT
Start: 2025-03-21 | End: 2025-03-21 | Stop reason: HOSPADM

## 2025-03-21 RX ORDER — SODIUM CHLORIDE 0.9 % (FLUSH) 0.9 %
5-40 SYRINGE (ML) INJECTION EVERY 12 HOURS SCHEDULED
Status: DISCONTINUED | OUTPATIENT
Start: 2025-03-21 | End: 2025-03-21 | Stop reason: HOSPADM

## 2025-03-21 RX ORDER — DIPHENHYDRAMINE HYDROCHLORIDE 50 MG/ML
12.5 INJECTION, SOLUTION INTRAMUSCULAR; INTRAVENOUS
Status: DISCONTINUED | OUTPATIENT
Start: 2025-03-21 | End: 2025-03-21 | Stop reason: HOSPADM

## 2025-03-21 RX ORDER — GLYCOPYRROLATE 0.2 MG/ML
INJECTION INTRAMUSCULAR; INTRAVENOUS
Status: DISCONTINUED | OUTPATIENT
Start: 2025-03-21 | End: 2025-03-21 | Stop reason: SDUPTHER

## 2025-03-21 RX ORDER — HYDROMORPHONE HYDROCHLORIDE 1 MG/ML
0.5 INJECTION, SOLUTION INTRAMUSCULAR; INTRAVENOUS; SUBCUTANEOUS EVERY 5 MIN PRN
Status: DISCONTINUED | OUTPATIENT
Start: 2025-03-21 | End: 2025-03-21 | Stop reason: HOSPADM

## 2025-03-21 RX ORDER — ROPIVACAINE HYDROCHLORIDE 5 MG/ML
INJECTION, SOLUTION EPIDURAL; INFILTRATION; PERINEURAL
Status: COMPLETED
Start: 2025-03-21 | End: 2025-03-21

## 2025-03-21 RX ORDER — FENTANYL CITRATE 50 UG/ML
INJECTION, SOLUTION INTRAMUSCULAR; INTRAVENOUS
Status: DISCONTINUED | OUTPATIENT
Start: 2025-03-21 | End: 2025-03-21 | Stop reason: SDUPTHER

## 2025-03-21 RX ORDER — LIDOCAINE HYDROCHLORIDE 40 MG/ML
SOLUTION TOPICAL
Status: DISCONTINUED | OUTPATIENT
Start: 2025-03-21 | End: 2025-03-21 | Stop reason: SDUPTHER

## 2025-03-21 RX ORDER — CELECOXIB 200 MG/1
400 CAPSULE ORAL ONCE
Status: COMPLETED | OUTPATIENT
Start: 2025-03-21 | End: 2025-03-21

## 2025-03-21 RX ORDER — SODIUM CHLORIDE 9 MG/ML
INJECTION, SOLUTION INTRAVENOUS PRN
Status: DISCONTINUED | OUTPATIENT
Start: 2025-03-21 | End: 2025-03-21 | Stop reason: HOSPADM

## 2025-03-21 RX ORDER — MIDAZOLAM HYDROCHLORIDE 1 MG/ML
INJECTION, SOLUTION INTRAMUSCULAR; INTRAVENOUS
Status: DISCONTINUED | OUTPATIENT
Start: 2025-03-21 | End: 2025-03-21 | Stop reason: SDUPTHER

## 2025-03-21 RX ORDER — MIDAZOLAM HYDROCHLORIDE 1 MG/ML
INJECTION, SOLUTION INTRAMUSCULAR; INTRAVENOUS
Status: COMPLETED
Start: 2025-03-21 | End: 2025-03-21

## 2025-03-21 RX ORDER — FENTANYL CITRATE 50 UG/ML
50 INJECTION, SOLUTION INTRAMUSCULAR; INTRAVENOUS
Status: COMPLETED | OUTPATIENT
Start: 2025-03-21 | End: 2025-03-21

## 2025-03-21 RX ORDER — KETOROLAC TROMETHAMINE 30 MG/ML
INJECTION, SOLUTION INTRAMUSCULAR; INTRAVENOUS
Status: DISCONTINUED | OUTPATIENT
Start: 2025-03-21 | End: 2025-03-21 | Stop reason: SDUPTHER

## 2025-03-21 RX ORDER — DEXAMETHASONE SODIUM PHOSPHATE 4 MG/ML
INJECTION, SOLUTION INTRA-ARTICULAR; INTRALESIONAL; INTRAMUSCULAR; INTRAVENOUS; SOFT TISSUE
Status: DISCONTINUED | OUTPATIENT
Start: 2025-03-21 | End: 2025-03-21 | Stop reason: SDUPTHER

## 2025-03-21 RX ORDER — LIDOCAINE HCL/PF 100 MG/5ML
SYRINGE (ML) INJECTION
Status: DISCONTINUED | OUTPATIENT
Start: 2025-03-21 | End: 2025-03-21 | Stop reason: SDUPTHER

## 2025-03-21 RX ORDER — DEXAMETHASONE SODIUM PHOSPHATE 10 MG/ML
10 INJECTION, SOLUTION INTRAMUSCULAR; INTRAVENOUS ONCE
Status: COMPLETED | OUTPATIENT
Start: 2025-03-21 | End: 2025-03-21

## 2025-03-21 RX ORDER — HYDROMORPHONE HYDROCHLORIDE 1 MG/ML
0.5 INJECTION, SOLUTION INTRAMUSCULAR; INTRAVENOUS; SUBCUTANEOUS EVERY 10 MIN PRN
Status: DISCONTINUED | OUTPATIENT
Start: 2025-03-21 | End: 2025-03-21 | Stop reason: HOSPADM

## 2025-03-21 RX ORDER — OXYCODONE HYDROCHLORIDE 5 MG/1
10 TABLET ORAL
Refills: 0 | Status: DISCONTINUED | OUTPATIENT
Start: 2025-03-21 | End: 2025-03-21 | Stop reason: HOSPADM

## 2025-03-21 RX ORDER — OXYCODONE HCL 10 MG/1
10 TABLET, FILM COATED, EXTENDED RELEASE ORAL ONCE
Refills: 0 | Status: COMPLETED | OUTPATIENT
Start: 2025-03-21 | End: 2025-03-21

## 2025-03-21 RX ORDER — PROPOFOL 10 MG/ML
INJECTION, EMULSION INTRAVENOUS
Status: DISCONTINUED | OUTPATIENT
Start: 2025-03-21 | End: 2025-03-21 | Stop reason: SDUPTHER

## 2025-03-21 RX ORDER — LABETALOL HYDROCHLORIDE 5 MG/ML
10 INJECTION, SOLUTION INTRAVENOUS
Status: DISCONTINUED | OUTPATIENT
Start: 2025-03-21 | End: 2025-03-21 | Stop reason: HOSPADM

## 2025-03-21 RX ORDER — FENTANYL CITRATE 50 UG/ML
INJECTION, SOLUTION INTRAMUSCULAR; INTRAVENOUS
Status: COMPLETED
Start: 2025-03-21 | End: 2025-03-21

## 2025-03-21 RX ORDER — LIDOCAINE HYDROCHLORIDE 10 MG/ML
1 INJECTION, SOLUTION EPIDURAL; INFILTRATION; INTRACAUDAL; PERINEURAL
Status: DISCONTINUED | OUTPATIENT
Start: 2025-03-21 | End: 2025-03-21 | Stop reason: HOSPADM

## 2025-03-21 RX ADMIN — KETOROLAC TROMETHAMINE 30 MG: 30 INJECTION, SOLUTION INTRAMUSCULAR at 09:27

## 2025-03-21 RX ADMIN — Medication 100 MCG: at 07:52

## 2025-03-21 RX ADMIN — MEPERIDINE HYDROCHLORIDE 12.5 MG: 25 INJECTION, SOLUTION INTRAMUSCULAR; INTRAVENOUS; SUBCUTANEOUS at 11:05

## 2025-03-21 RX ADMIN — ACETAMINOPHEN 975 MG: 325 TABLET ORAL at 06:19

## 2025-03-21 RX ADMIN — HYDROMORPHONE HYDROCHLORIDE 1 MG: 1 INJECTION, SOLUTION INTRAMUSCULAR; INTRAVENOUS; SUBCUTANEOUS at 09:43

## 2025-03-21 RX ADMIN — HYDROMORPHONE HYDROCHLORIDE 0.5 MG: 1 INJECTION, SOLUTION INTRAMUSCULAR; INTRAVENOUS; SUBCUTANEOUS at 10:16

## 2025-03-21 RX ADMIN — Medication 50 MCG: at 09:23

## 2025-03-21 RX ADMIN — METHOCARBAMOL 250 MG: 100 INJECTION INTRAMUSCULAR; INTRAVENOUS at 08:29

## 2025-03-21 RX ADMIN — FENTANYL CITRATE 50 MCG: 50 INJECTION INTRAMUSCULAR; INTRAVENOUS at 10:30

## 2025-03-21 RX ADMIN — ROPIVACAINE HYDROCHLORIDE 30 ML: 5 INJECTION, SOLUTION EPIDURAL; INFILTRATION; PERINEURAL at 07:20

## 2025-03-21 RX ADMIN — Medication 100 MCG: at 07:48

## 2025-03-21 RX ADMIN — PROPOFOL 200 MG: 10 INJECTION, EMULSION INTRAVENOUS at 07:39

## 2025-03-21 RX ADMIN — ROCURONIUM 20 MG: 50 INJECTION, SOLUTION INTRAVENOUS at 09:07

## 2025-03-21 RX ADMIN — METOCLOPRAMIDE HYDROCHLORIDE 10 MG: 5 INJECTION INTRAMUSCULAR; INTRAVENOUS at 10:50

## 2025-03-21 RX ADMIN — Medication 100 MCG: at 08:03

## 2025-03-21 RX ADMIN — SUGAMMADEX 200 MG: 100 INJECTION, SOLUTION INTRAVENOUS at 09:40

## 2025-03-21 RX ADMIN — Medication 100 MCG: at 08:00

## 2025-03-21 RX ADMIN — METHOCARBAMOL 250 MG: 100 INJECTION INTRAMUSCULAR; INTRAVENOUS at 08:20

## 2025-03-21 RX ADMIN — HYDROMORPHONE HYDROCHLORIDE 0.5 MG: 1 INJECTION, SOLUTION INTRAMUSCULAR; INTRAVENOUS; SUBCUTANEOUS at 10:10

## 2025-03-21 RX ADMIN — SODIUM CHLORIDE, SODIUM LACTATE, POTASSIUM CHLORIDE, AND CALCIUM CHLORIDE: .6; .31; .03; .02 INJECTION, SOLUTION INTRAVENOUS at 06:48

## 2025-03-21 RX ADMIN — TRANEXAMIC ACID 1000 MG: 100 INJECTION, SOLUTION INTRAVENOUS at 07:55

## 2025-03-21 RX ADMIN — OXYCODONE HYDROCHLORIDE 10 MG: 5 TABLET ORAL at 10:49

## 2025-03-21 RX ADMIN — MIDAZOLAM HYDROCHLORIDE 2 MG: 1 INJECTION, SOLUTION INTRAMUSCULAR; INTRAVENOUS at 07:18

## 2025-03-21 RX ADMIN — Medication 100 MCG: at 08:08

## 2025-03-21 RX ADMIN — FENTANYL CITRATE 50 MCG: 50 INJECTION INTRAMUSCULAR; INTRAVENOUS at 10:51

## 2025-03-21 RX ADMIN — Medication 100 MG: at 07:39

## 2025-03-21 RX ADMIN — GABAPENTIN 300 MG: 300 CAPSULE ORAL at 06:19

## 2025-03-21 RX ADMIN — GLYCOPYRROLATE 0.2 MG: 0.2 INJECTION INTRAMUSCULAR; INTRAVENOUS at 07:48

## 2025-03-21 RX ADMIN — SODIUM CHLORIDE, SODIUM LACTATE, POTASSIUM CHLORIDE, AND CALCIUM CHLORIDE: .6; .31; .03; .02 INJECTION, SOLUTION INTRAVENOUS at 08:59

## 2025-03-21 RX ADMIN — METHOCARBAMOL 250 MG: 100 INJECTION INTRAMUSCULAR; INTRAVENOUS at 09:15

## 2025-03-21 RX ADMIN — FENTANYL CITRATE 100 MCG: 50 INJECTION, SOLUTION INTRAMUSCULAR; INTRAVENOUS at 07:18

## 2025-03-21 RX ADMIN — OXYCODONE HYDROCHLORIDE 10 MG: 10 TABLET, FILM COATED, EXTENDED RELEASE ORAL at 06:19

## 2025-03-21 RX ADMIN — WATER 2000 MG: 1 INJECTION INTRAMUSCULAR; INTRAVENOUS; SUBCUTANEOUS at 07:50

## 2025-03-21 RX ADMIN — LIDOCAINE HYDROCHLORIDE 4 ML: 40 SOLUTION TOPICAL at 07:40

## 2025-03-21 RX ADMIN — METHOCARBAMOL 250 MG: 100 INJECTION INTRAMUSCULAR; INTRAVENOUS at 09:00

## 2025-03-21 RX ADMIN — MEPERIDINE HYDROCHLORIDE 12.5 MG: 25 INJECTION, SOLUTION INTRAMUSCULAR; INTRAVENOUS; SUBCUTANEOUS at 11:10

## 2025-03-21 RX ADMIN — CELECOXIB 400 MG: 200 CAPSULE ORAL at 06:19

## 2025-03-21 RX ADMIN — DEXAMETHASONE SODIUM PHOSPHATE 4 MG: 4 INJECTION INTRA-ARTICULAR; INTRALESIONAL; INTRAMUSCULAR; INTRAVENOUS; SOFT TISSUE at 07:50

## 2025-03-21 RX ADMIN — ONDANSETRON 4 MG: 2 INJECTION INTRAMUSCULAR; INTRAVENOUS at 09:27

## 2025-03-21 RX ADMIN — ROCURONIUM 100 MG: 50 INJECTION, SOLUTION INTRAVENOUS at 07:40

## 2025-03-21 RX ADMIN — DEXAMETHASONE SODIUM PHOSPHATE 10 MG: 10 INJECTION, SOLUTION INTRAMUSCULAR; INTRAVENOUS at 06:46

## 2025-03-21 ASSESSMENT — PAIN DESCRIPTION - ORIENTATION
ORIENTATION: RIGHT

## 2025-03-21 ASSESSMENT — PAIN - FUNCTIONAL ASSESSMENT
PAIN_FUNCTIONAL_ASSESSMENT: PREVENTS OR INTERFERES SOME ACTIVE ACTIVITIES AND ADLS
PAIN_FUNCTIONAL_ASSESSMENT: PREVENTS OR INTERFERES SOME ACTIVE ACTIVITIES AND ADLS
PAIN_FUNCTIONAL_ASSESSMENT: 0-10

## 2025-03-21 ASSESSMENT — PAIN DESCRIPTION - DESCRIPTORS
DESCRIPTORS: ACHING
DESCRIPTORS: DISCOMFORT
DESCRIPTORS: DISCOMFORT

## 2025-03-21 ASSESSMENT — PAIN SCALES - GENERAL
PAINLEVEL_OUTOF10: 8
PAINLEVEL_OUTOF10: 7
PAINLEVEL_OUTOF10: 0
PAINLEVEL_OUTOF10: 4
PAINLEVEL_OUTOF10: 8
PAINLEVEL_OUTOF10: 3
PAINLEVEL_OUTOF10: 6
PAINLEVEL_OUTOF10: 7
PAINLEVEL_OUTOF10: 0

## 2025-03-21 ASSESSMENT — PAIN DESCRIPTION - LOCATION
LOCATION: HIP

## 2025-03-21 NOTE — PROGRESS NOTES
Occupational Therapy  Facility/Department: [facility] GENERAL SURGERY  Occupational Therapy Initial Assessment /Treatment/Discharge     Name: Jose Guadalupe Lu  : 1975  MRN: [id number]  Date of Service: 3/21/2025    Discharge Recommendations:  24 hour supervision or assist  OT Equipment Recommendations  Equipment Needed: No  Other: Discussed possible toilet raiser, sock aid, reacher       Past Medical History:  has a past medical history of Right hip pain.  Past Surgical History:  has a past surgical history that includes shoulder surgery (Right); knee surgery (Right); and Colonoscopy (N/A, 2022).           Assessment  Assessment: Pt CG for ADL and functional mobility.  Anticipate rapid progress.  Pt demonstrates good safety awareness.  Prognosis: Good  Decision Making: Medium Complexity  REQUIRES OT FOLLOW-UP: No  Activity Tolerance  Activity Tolerance: Patient Tolerated treatment well     Plan  Occupational Therapy Plan  Additional Comments: Discharge pt from acute OT    Restrictions  Position Activity Restriction  Other Position/Activity Restrictions: FWBAT.  Anterior approach. No straight leg raises.    Subjective  General  Chart Reviewed: Yes  Additional Pertinent Hx: : RIGHT TOTAL HIP REPLACEMENT DIRECT ANTERIOR APPROACH, STACEY JAI ROBOTIC ASSISTED HIP REPLACEMENT  Family / Caregiver Present: No  Referring Practitioner: MONTY Farris  Diagnosis: OA Rt hip  Subjective  Subjective: Pt on stretcher upon entry.  Pt reports feeling well.  Pain: 2/10     Social/Functional History  Social/Functional History  Lives With: Spouse (4 children)  Type of Home: House  Home Layout: Two level, Bed/Bath upstairs, 1/2 bath on main level  Home Access: Stairs to enter without rails (3)  Bathroom Shower/Tub: Tub/Shower unit  Bathroom Toilet: Standard (sink for leverage)  Bathroom Equipment: None  Home Equipment: Walker - Rolling, Crutches  Has the patient had two or more falls in the past year or any fall with injury

## 2025-03-21 NOTE — H&P
H&P Update     An electronic and hard copy history and physical was reviewed in the patient's chart.  Date of Surgery Update: March 21, 2025  Jose Guadalupe Lu was seen and examined.  Patient identified by surgeon; surgical site was confirmed by patient and surgeon.  ?  Signed By: Sincerely,    Sol Thakkar MD [facility]  Orthopaedic Surgeon - Hip Preservation & Sports Medicine   [facility] [address], [address], [address]  Email: [email or web address]  Office: [phone]    03/21/25  7:38 AM     ?    ?  ?

## 2025-03-21 NOTE — ANESTHESIA PRE PROCEDURE
results found for: \"PHART\", \"PO2ART\", \"AEH1JCD\", \"CHH6QLG\", \"BEART\", \"B1XUUMFC\"     Type & Screen (If Applicable):  No results found for: \"ABORH\", \"LABANTI\"    Drug/Infectious Status (If Applicable):  Lab Results   Component Value Date/Time    HIV Non-Reactive 10/07/2021 08:43 AM       COVID-19 Screening (If Applicable): No results found for: \"COVID19\"        Anesthesia Evaluation  Patient summary reviewed and Nursing notes reviewed   no history of anesthetic complications:   Airway: Mallampati: II  TM distance: >3 FB   Neck ROM: full  Mouth opening: > = 3 FB   Dental:          Pulmonary:Negative Pulmonary ROS                              Cardiovascular:Negative CV ROS                      Neuro/Psych:   Negative Neuro/Psych ROS              GI/Hepatic/Renal: Neg GI/Hepatic/Renal ROS            Endo/Other: Negative Endo/Other ROS                    Abdominal:             Vascular: negative vascular ROS.         Other Findings:       Anesthesia Plan      general     ASA 2     (50-year-old male presents for RIGHT TOTAL HIP REPLACEMENT DIRECT ANTERIOR APPROACH, STACEY JAI ROBOTIC ASSISTED HIP REPLACEMENT.  Plan general anesthesia with ASA standard monitors.  Right PENG block for postoperative pain control as requested by the attending surgeon.  Questions answered.  Patient agreeable with anesthetic plan.  )  Induction: intravenous.      Anesthetic plan and risks discussed with patient.      Plan discussed with CRNA.    Attending anesthesiologist reviewed and agrees with Preprocedure content            JARVIS SHAY MD   3/21/2025

## 2025-03-21 NOTE — OP NOTE
Operative Note      Patient: Jose Guadalupe Lu  YOB: 1975  MRN: [id number]    Date of Procedure: 3/21/2025    Pre-Op Diagnosis Codes:      * Primary osteoarthritis of right hip [M16.11]    Post-Op Diagnosis: Same       Procedure(s):  RIGHT TOTAL HIP REPLACEMENT DIRECT ANTERIOR APPROACH, STACEY JAI ROBOTIC ASSISTED HIP REPLACEMENT    Surgeon(s):  Sol Thakkar MD    Assistant:   Surgical Assistant: Rosalio Watkins; Nicanor Gutiérrez  Physician Assistant: Lois Kaur PA    Anesthesia: General + PENG BLOCK    Estimated Blood Loss (mL): 200 cc    Complications: None    Specimens:   * No specimens in log *    Implants:  * No implants in log *      Drains: * No LDAs found *    Findings:  End stage hip OA     Detailed Description of Procedure:     Implant Record:  Trident II Tritanium Clusterhole Acetabular Shell: 58mm  Trident X3 0deg Polyethylene Insert:  40 mm   In[employer]ia Hip Stem -  High Offset:  size 8       Biolox delta Ceramic V40 Femoral Head:  40 mm + 0 mm     Acetabular Low Profile Hex Screw : 6.5mm +  30 mm       Operative Report:  Indications: The patient is a 50 y.o. male with persistent right hip pain that interferes with daily activity.  The he has failed conservative treatment and after reviewing the risks, benefits and alternatives, he has elected to undergo a total hip arthroplasty.  I have reviewed the benefits and draw backs of an anterior approach and he is prepared to proceed, consent was obtained and all questions were answered to his satisfaction.    Description:   The patient was identified in the preoperative holding area and the correct site of the right hip was marked.  He was then brought to the operating room and kept on her hospital bed in the supine position and general anesthesia was administered.  Well-padded ski boots were placed on both feet to secure them into the Morton table.  He was then transferred to the operative table and placed against a well-padded perineal post with  improve exposure.  The canal was then opened proximally and using a hand rasp its direction verified.  The starting broach was then used with care taken to insure appropriate version relative to the posterior femoral cortex.  The femur was then broached up as per technique and the final broach showed excellent seating.  Its relationship to the calcar was flush.  A trial  high offset was placed as well as a trial  40 mm + 4  mm femoral ball.    The femoral support hook was lowered and the hip was brought back into the reduced position.  Fluoroscopic views were obtained of the AP pelvis and referencing the lesser trochanter on the contralateral hip and estimation of offset and limb length were made.  They were judged to be the best approximation to provide stability for the sizes. Leg lengths and offset were also verified with the tracking probe to pre-established bone landmarks on the GT and as well on the patella. It was deemed that a 40mm + 0mm femoral ball would restore offset and leg length as per pre-operative planning.    The hip was then dislocated again and placed back into the broaching position.  The trials were removed and the area was thoroughly irrigated.  The final femoral implant (   Insignia Hip Stem  high offset : size 8 )was then impacted into position and shown to be resting at the level of the calcar similar to the trials.  The final femoral head was also impacted into position and shown to be secured.  The femoral support hook was removed and the hip was brought into reduction again.  Fluoroscopic views showed similar limb length and offset relative to the trial reduction.  The joint shuck on the operative table was satisfactory.  The hip was brought through range of motion after removing the limb from the spar and brought to flexion and shown to be stable.  External  rotation with the hip extended was satisfactory and there was no evidence of component impingement.    The checkpoint on the femur

## 2025-03-21 NOTE — PROGRESS NOTES
Pt still c/o high pain, HR increased from SB to NSR with beats between 75-85.   PRNs administered per eMAR.  10mg Oxycodone admin.  Tolerating PO, denies nausea.   Page placed into OR 11 to talk with MONTY Abreu after she completes her current case to discuss pt's significant pain level.

## 2025-03-21 NOTE — PROGRESS NOTES
Pt sleepy, but pain is \"much better.\"   - Will allow pt to sleep for a little longer before attempting to work with PT/OT

## 2025-03-21 NOTE — PROGRESS NOTES
MONTY Abreu and Dr Thakkar at bedside evaluating pt.   No concerns or issues intra-op that would lead to increased pain per se, other than his large muscular build that requires more torque to perform the operation.    Pt currently laying in bed with eyes closed, objectively appears comfortable at this time.

## 2025-03-21 NOTE — PROGRESS NOTES
Patient watched total joint video pre-op.  Electronically signed by Adrienne Ramsey RN on 3/21/2025 at 6:32 AM

## 2025-03-21 NOTE — FLOWSHEET NOTE
PACU Transfer to [facility]    Pt meets criteria to transfer to next phase of care per SALLY SCORE and ASPAN standards    Pain upon d/c from PACU:  pain controlled  Sally Phase I: Sally Score: 10  Post-op Nausea & Vomiting: no nausea and no vomiting  Last Vitals:   Vitals:    03/21/25 1330   BP: 122/78   Pulse: 74   Resp: 14   Temp: 98.2 °F (36.8 °C)   SpO2: 97%       In: 2900 [P.O.:240; I.V.:2600]  Out: -       Pt taken to [facility] #3 by Pau  Family/identified trustworthy individual(s) updated on POC and change in pt's location.

## 2025-03-21 NOTE — PROGRESS NOTES
Procedure: SALONI magana MD: Dr. Mendez  Timeout performed.  Pt monitored closely on heart monitor, 2L NC, continuous pulse oximetry, EtCO2, and frequent BPs.   Pt remained alert and oriented x4. pt tolerated procedure well.

## 2025-03-21 NOTE — ANESTHESIA PROCEDURE NOTES
as a PENG block.  Block tolerated well; there were no apparent complications at the time of the procedure.       Medications Administered  ropivacaine (NAROPIN) injection 0.5% - Perineural   30 mL - 3/21/2025 7:20:00 AM          Tom Mendez MD  March 21, 2025 7:31 AM

## 2025-03-21 NOTE — DISCHARGE INSTRUCTIONS
Aches  [x]You may experience some generalized body aches as your muscles recover from medications used to relax them during surgery.  These will gradually subside.     ACTIVITY:  For the remainder of the day, stay at home and rest. You may be sleepy up to 24 hours.  NO DRIVING or operating hazardous machinery for 24 hours.  Check with your doctor if you have any questions about returning to work, sports, or strenuous physical activity.   AVOID MAKING  MAJOR  DECISIONS TODAY or SIGNING  IMPORTANT PAPERS.   DIET:  NO alcohol for 24 hours following your surgery.  Begin with liquids and EAT LIGHTLY at first.  Eat and drink small amounts at frequent intervals, today.    MEDICATION INSTRUCTIONS:    [x]Prescription(S) should have been prescribed and dispensed prior to your surgery date. Use as directed.  When taking pain medications, you may experience the side effect of dizziness or drowsiness.  Do not drink alcohol or drive when taking these medications.    [x]Give the list of your medications to your primary care physician on your next visit. Keep your med list updated and carry it with in case of emergencies.    [x] Narcotic pain medications can cause the side effect of significant constipation.  You may want to add a stool softener to your postoperative medication schedule or speak to your surgeon on how best to manage this side effect.    NARCOTIC SAFETY:    A pain management plan spells out ways you can deal with your pain at home. You and your care team will create this plan before you leave the hospital. The plan may include:  The goals of your treatment. This may include how you can expect your pain and function to improve.  The treatments your doctor suggests for your pain. These may include medicines, physical therapy, or relaxation exercises.    Common side effects of opioids also include constipation and being sleepy. More serious effects include needing larger doses over time, getting sick if you stop taking  manage pain, this advice can help you stay safe.  Take opioids exactly as directed.   Follow the directions carefully. It's easy to misuse opioids if you take a dose other than what's prescribed by your doctor. Even sharing them with someone they were not meant for is misuse.  Do not drive or operate machinery.   Opioids may affect your judgment and decision making. Talk with your doctor about when it is safe to drive.  Avoid alcohol, sleeping medicines, and muscle relaxers.   Opioids can be dangerous if you take them with alcohol or with certain drugs like sleeping pills and muscle relaxers. The combination can decrease your breathing rate and lead to overdose or death. Make sure your doctor knows about all the other medicines you take, including over-the-counter medicines. Don't start any new medicines before you talk to your doctor or pharmacist.  Ask your doctor about a naloxone rescue kit.   It can help you--and even save your life--if you take too much of an opioid.    If you have a CPAP machine, it is very important that you use it daily during all periods of sleep and daytime rest during your recovery at home.  Surgery and Anesthesia place a significant amount of stress on your body.  Using your CPAP will help keep you safe and lessen the negative effects of that stress.      CALL YOUR PHYSICIAN FOR:  Watch for these possible complications, symptoms, or side effects of anesthesia.  Call physician if they or any other problems occur:    Signs of INFECTION   > Fever over 101°     > Redness, swelling, hardness or warmth at the operative site   >Foul smelling or cloudy drainage at the operative site   Unrelieved PAIN  Unrelieved NAUSEA  Blood soaked dressing.  (Some oozing may be normal)  Inability to urinate      Numb, pale, blue, cold or tingling extremity        If you smoke STOP. We care about your health!

## 2025-03-21 NOTE — PROGRESS NOTES
Physical Therapy  Facility/Department: [facility] GENERAL SURGERY  Physical Therapy Initial Assessment / DISCHARGE    Name: Jose Guadalupe Lu  : 1975  MRN: [id number]  Date of Service: 3/21/2025    Discharge Recommendations:  24 hour supervision or assist, Outpatient PT   PT Equipment Recommendations  Equipment Needed: No    Assessment  Assessment: Pt POD 0 right total hip replacement, anterior approach.  Pt sleepy, but doing well from PT standpoint.  Pt demonstrates transfers and gait using rolling walker with CGA for safety.  Gait is slow, but steady.  Tolerated stair assessment without difficulty.  Pt plans to return home today with assist from wife/family.  No further acute PT needs.  Recommend OP PT.  Decision Making: Medium Complexity  Requires PT Follow-Up: No    Plan  General Plan: Discharge with evaluation only    Safety Devices  Type of Devices: Left in bed, Nurse notified (in PACU)    Restrictions  Other Position/Activity Restrictions: FWBAT.  Anterior approach. No straight leg raises.     Subjective  Chart Reviewed: Yes  Additional Pertinent Hx: Pt admitted to PACU 3/21 s/p right THR by Dr. Thakkar.  PMH:  None    Diagnosis: R hip OA    Subjective  Subjective: Pt found supine in bed.  Pt reports feeling sleepy, but ready to go home.  \"Let's do this.\"  Pain 2/10 R hip, RN aware.    Social/Functional History  Lives With: Spouse (4 children)  Type of Home: House  Home Layout: Two level, Bed/Bath upstairs, 1/2 bath on main level  Home Access: Stairs to enter without rails (3)  Bathroom Shower/Tub: Tub/Shower unit  Bathroom Toilet: Standard (sink for leverage)  Bathroom Equipment: None  Home Equipment: Walker - Rolling, Crutches  Has the patient had two or more falls in the past year or any fall with injury in the past year?: No  Prior Level of Assist for ADLs: Independent  Prior Level of Assist for Homemaking: Independent  Prior Level of Assist for Ambulation: Independent community ambulator, with or without

## 2025-03-21 NOTE — FLOWSHEET NOTE
ADMISSION TO PACU     Patient: Jose Guadalupe Lu    Procedure(s):  RIGHT TOTAL HIP REPLACEMENT DIRECT ANTERIOR APPROACH, STACEY JAI ROBOTIC ASSISTED HIP REPLACEMENT    Level of consciousness: lethargic; oral airway in place  Nursing Assessment: nellaaze with tegaderm and mepilex border on R hip - C/D/I  - ice pack applied  - RLE foot warm, good cap refill, +1 pedal pulse ELIJAH sensation or strength d/t lethargy  - L pedal pulse +1 as well   - IVA hoses on BLE    Abeba PACU #1: Abeba Score: 5  1st PACU Vitals:   Vitals:    03/21/25 0955   BP: 107/66   Pulse: 57   Resp: 17   Temp: 98 °F (36.7 °C)   SpO2: 98%     Pain upon arrival to PACU: none per FLACC    No results for input(s): \"POCGLU\" in the last 72 hours.    Lab Results   Component Value Date/Time    HGB 14.7 03/04/2025 10:19 AM     03/04/2025 10:19 AM    K 4.4 03/04/2025 10:19 AM    CREATININE 1.0 03/04/2025 10:19 AM         Intake/Output Summary (Last 24 hours) at 3/21/2025 1003  Last data filed at 3/21/2025 0859  Gross per 24 hour   Intake 1060 ml   Output --   Net 1060 ml

## 2025-03-21 NOTE — PROGRESS NOTES
Ambulatory Surgery/Procedure Discharge Note    Vitals:    03/21/25 1402   BP: 121/65   Pulse: 58   Resp: 16   Temp: 97.2 °F (36.2 °C)   SpO2: 93%       In: 2900 [P.O.:240; I.V.:2600]  Out: -     Restroom use offered before discharge.  Yes    Pain assessment:  none      Pt states readiness to discharge home. No c/o pain or nausea.     Patient discharged to home/self care. Patient discharged via wheel chair by transporter to waiting family/S.O.       3/21/2025 2:35 PM

## 2025-03-21 NOTE — ANESTHESIA POSTPROCEDURE EVALUATION
Department of Anesthesiology  Postprocedure Note    Patient: Jose Guadalupe Lu  MRN: [id number]  YOB: 1975  Date of evaluation: 3/21/2025    Procedure Summary       Date: 03/21/25 Room / Location: [place] [address] / [facility]    Anesthesia Start: 0734 Anesthesia Stop: 0958    Procedure: RIGHT TOTAL HIP REPLACEMENT DIRECT ANTERIOR APPROACH, STACEY JAI ROBOTIC ASSISTED HIP REPLACEMENT (Right: Hip) Diagnosis:       Primary osteoarthritis of right hip      (Primary osteoarthritis of right hip [M16.11])    Surgeons: Sol Thakkar MD Responsible Provider: Tom Mendez MD    Anesthesia Type: general ASA Status: 2            Anesthesia Type: No value filed.    Abeba Phase I: Abeba Score: 9    Abeba Phase II:      Anesthesia Post Evaluation    Patient location during evaluation: PACU  Patient participation: complete - patient participated  Level of consciousness: awake and alert  Pain score: 4  Airway patency: patent  Nausea & Vomiting: no nausea and no vomiting  Cardiovascular status: hemodynamically stable  Respiratory status: acceptable  Hydration status: euvolemic  Pain management: adequate    No notable events documented.

## 2025-03-24 ENCOUNTER — OFFICE VISIT (OUTPATIENT)
Dept: ORTHOPEDIC SURGERY | Age: 50
End: 2025-03-24

## 2025-03-24 VITALS — RESPIRATION RATE: 12 BRPM | HEIGHT: 78 IN | WEIGHT: 210 LBS | BODY MASS INDEX: 24.3 KG/M2

## 2025-03-24 DIAGNOSIS — M16.11 PRIMARY OSTEOARTHRITIS OF RIGHT HIP: ICD-10-CM

## 2025-03-24 DIAGNOSIS — Z96.641 STATUS POST HIP REPLACEMENT, RIGHT: Primary | ICD-10-CM

## 2025-03-24 PROCEDURE — 99024 POSTOP FOLLOW-UP VISIT: CPT

## 2025-03-24 NOTE — PROGRESS NOTES
History of Present Illness:  Jose Guadalupe Lu is a pleasant 50 y.o. male who presents for a post operative visit. He is 3 days out following a right total hip arthroplasty direct anterior approach, rowan capellan. Overall He is doing okay and feels that their pain is well controlled with current pain medications. He has been compliant with the weight bearing instructions and anterior precautions. He plans to do physical therapy at the [place] office with florentin MOYER.     He denies fevers, chills, numbness, tingling, and shortness of breath.    Medical History:  Patient's medications, allergies, past medical, surgical, social and family histories were reviewed and updated as appropriate.    No notes on file    Review of Systems  A 14 point review of systems was completed by the patient and is available in the media section of the scanned medical record and was reviewed on 3/24/2025.      Vital Signs:  Vitals:    03/24/25 1203   Resp: 12       General/Appearance: Alert and oriented and in no apparent distress.    Skin:  There are no skin lesions, cellulitis, or extreme edema. The patient has warm and well-perfused Bilateral lower  extremities with brisk capillary refill.      Hip  Exam: right    Inspection: Hip incision(s)  are clean, dry and intact and well approximated. The Therabond dressing  is still in place. Mild ecchymosis and swelling are present as can be expected. There is no erythema, drainage or other signs of infection    Palpation:  No crepitus to gentle motion / circumduction of the hip    Active Range of Motion: Deferred    Passive Range of Motion: 0-90, limber. Painful extension.     Strength:  Deferred    Special Tests:  Deferred.    Neurovascular: Sensation to light touch is intact, no motor deficits, palpable radial pulses 2+    Radiology:     Plain radiographs of the right hip comprising 3 views (AP Pelvis, Navas View and False Profile view of the right hip) were obtained and reviewed in the office: Shows

## 2025-03-26 ENCOUNTER — HOSPITAL ENCOUNTER (OUTPATIENT)
Dept: PHYSICAL THERAPY | Age: 50
Setting detail: THERAPIES SERIES
Discharge: HOME | End: 2025-03-26
Attending: ORTHOPAEDIC SURGERY
Payer: COMMERCIAL

## 2025-03-26 DIAGNOSIS — M25.551 ACUTE POSTOPERATIVE PAIN OF RIGHT HIP: ICD-10-CM

## 2025-03-26 DIAGNOSIS — R26.2 DIFFICULTY WALKING: Primary | ICD-10-CM

## 2025-03-26 DIAGNOSIS — G89.18 ACUTE POSTOPERATIVE PAIN OF RIGHT HIP: ICD-10-CM

## 2025-03-26 PROCEDURE — 97140 MANUAL THERAPY 1/> REGIONS: CPT | Performed by: PHYSICAL THERAPIST

## 2025-03-26 PROCEDURE — 97110 THERAPEUTIC EXERCISES: CPT | Performed by: PHYSICAL THERAPIST

## 2025-03-26 NOTE — PLAN OF CARE
[facility] - Outpatient Rehabilitation and Therapy: [address], [place] [address] office: [phone] fax: [phone]     Physical Therapy Initial Evaluation Certification      Dear Sol Thakkar MD ,    We had the pleasure of evaluating the following patient for physical therapy services at [facility] Out[facility] Physical Therapy.  A summary of our findings can be found in the initial assessment below.  This includes our plan of care.  If you have any questions or concerns regarding these findings, please do not hesitate to contact me at the office phone number listed above.  Thank you for the referral.     Physician Signature:_______________________________Date:__________________  By signing above (or electronic signature), therapist’s plan is approved by physician       Physical Therapy: TREATMENT/PROGRESS NOTE   Patient: Jose Guadalupe Lu (50 y.o. male)   Examination Date: 2025   :  1975 MRN: [id number]   Visit #: 1   Insurance Allowable Auth Needed   $25 copay   60 pcy, but needs auth [x]Yes    []No    Insurance: [place] / [place] / Product Type: *No Product type* /   Insurance ID: [id number] - ([facility])  Secondary Insurance (if applicable):    Treatment Diagnosis:     ICD-10-CM    1. Difficulty walking  R26.2       2. Acute postoperative pain of right hip  G89.18     M25.551          Medical Diagnosis:  Right hip pain [M25.551]   Referring Physician: Sol Thakkar MD  PCP: Ildefonso Gamez MD     Plan of care signed (Y/N):     Date of Patient follow up with Physician:      Plan of Care Report: EVAL today  POC update due: (10 visits /OR AUTH LIMITS, whichever is less)  2025                                             Medical History:  Comorbidities:  Osteoarthritis  Relevant Medical History: shoulder surgery                                          Precautions/ Contra-indications:           Latex allergy:

## 2025-03-31 ENCOUNTER — TELEPHONE (OUTPATIENT)
Dept: ORTHOPEDIC SURGERY | Age: 50
End: 2025-03-31

## 2025-04-02 ENCOUNTER — HOSPITAL ENCOUNTER (OUTPATIENT)
Dept: PHYSICAL THERAPY | Age: 50
Setting detail: THERAPIES SERIES
Discharge: HOME | End: 2025-04-02
Attending: ORTHOPAEDIC SURGERY
Payer: COMMERCIAL

## 2025-04-02 PROCEDURE — 97140 MANUAL THERAPY 1/> REGIONS: CPT | Performed by: PHYSICAL THERAPIST

## 2025-04-02 PROCEDURE — 97110 THERAPEUTIC EXERCISES: CPT | Performed by: PHYSICAL THERAPIST

## 2025-04-02 NOTE — FLOWSHEET NOTE
[facility] - Outpatient Rehabilitation and Therapy: [address], [place] [address] office: [phone] fax: [phone]     Physical Therapy Initial Evaluation Certification      Dear Sol Thakkar MD ,    We had the pleasure of evaluating the following patient for physical therapy services at [facility] Out[facility] Physical Therapy.  A summary of our findings can be found in the initial assessment below.  This includes our plan of care.  If you have any questions or concerns regarding these findings, please do not hesitate to contact me at the office phone number listed above.  Thank you for the referral.     Physician Signature:_______________________________Date:__________________  By signing above (or electronic signature), therapist’s plan is approved by physician       Physical Therapy: TREATMENT/PROGRESS NOTE   Patient: Jose Guadalupe Lu (50 y.o. male)   Examination Date: 2025   :  1975 MRN: [id number]   Visit #: 2   Insurance Allowable Auth Needed   $25 copay   60 pcy, but needs auth [x]Yes    []No    Insurance: [place] / [place] / Product Type: *No Product type* /   Insurance ID: [id number] - ([facility])  Secondary Insurance (if applicable):    Treatment Diagnosis:     ICD-10-CM    1. Difficulty walking  R26.2       2. Acute postoperative pain of right hip  G89.18     M25.551          Medical Diagnosis:  Right hip pain [M25.551]   Referring Physician: Sol Thakkar MD  PCP: Ildefonso Gamez MD     Plan of care signed (Y/N):     Date of Patient follow up with Physician:      Plan of Care Report: EVAL today  POC update due: (10 visits /OR AUTH LIMITS, whichever is less)  2025                                             Medical History:  Comorbidities:  Osteoarthritis  Relevant Medical History: shoulder surgery                                          Precautions/ Contra-indications:           Latex allergy:

## 2025-04-03 ENCOUNTER — OFFICE VISIT (OUTPATIENT)
Dept: ORTHOPEDIC SURGERY | Age: 50
End: 2025-04-03

## 2025-04-03 VITALS — WEIGHT: 205 LBS | BODY MASS INDEX: 23.72 KG/M2 | HEIGHT: 78 IN

## 2025-04-03 DIAGNOSIS — Z96.641 STATUS POST HIP REPLACEMENT, RIGHT: Primary | ICD-10-CM

## 2025-04-03 DIAGNOSIS — M16.11 PRIMARY OSTEOARTHRITIS OF RIGHT HIP: ICD-10-CM

## 2025-04-03 PROCEDURE — 99024 POSTOP FOLLOW-UP VISIT: CPT

## 2025-04-03 RX ORDER — AMOXICILLIN 500 MG/1
CAPSULE ORAL
Qty: 4 CAPSULE | Refills: 5 | Status: SHIPPED | OUTPATIENT
Start: 2025-04-03

## 2025-04-03 RX ORDER — CYCLOBENZAPRINE HCL 10 MG
10 TABLET ORAL NIGHTLY PRN
Qty: 10 TABLET | Refills: 0 | Status: SHIPPED | OUTPATIENT
Start: 2025-04-03 | End: 2025-05-03

## 2025-04-03 NOTE — PROGRESS NOTES
History of Present Illness:  Jose Guadalupe Lu is a pleasant 50 y.o. male who presents for a post operative visit. He is 2 weeks out following a right total hip arthroplasty, direct anterior approach. Overall He is doing okay and feels that their pain is well controlled with current pain medications. He has been compliant with the weight bearing instructions and anterior precautions. He has been in physical therapy at our [place] office with Dina MOYER. He feels mostly good in his hip, complains of quad tightness and swelling.     He denies fevers, chills, numbness, tingling, and shortness of breath.    Medical History:  Patient's medications, allergies, past medical, surgical, social and family histories were reviewed and updated as appropriate.    No notes on file    Review of Systems  A 14 point review of systems was completed by the patient and is available in the media section of the scanned medical record and was reviewed on 4/3/2025.      Vital Signs:  There were no vitals filed for this visit.    General/Appearance: Alert and oriented and in no apparent distress.    Skin:  There are no skin lesions, cellulitis, or extreme edema. The patient has warm and well-perfused Bilateral lower  extremities with brisk capillary refill.      Hip  Exam: right    Inspection: Hip incision(s)  are clean, dry and intact and well approximated. The Prineo/Dermabond dressing has now been removed. Mild ecchymosis and swelling are present as can be expected. There is no erythema, drainage or other signs of infection    Palpation:  No crepitus to gentle motion / circumduction of the hip    Active Range of Motion: Deferred    Passive Range of Motion: 0-90, limber no pain    Strength:  WNL     Special Tests:  Deferred.    Neurovascular: Sensation to light touch is intact, no motor deficits, palpable radial pulses 2+    Radiology:     Plain radiographs of the right hip comprising 3 views (AP Pelvis, Navas View and False Profile view of the

## 2025-04-08 ENCOUNTER — HOSPITAL ENCOUNTER (OUTPATIENT)
Dept: PHYSICAL THERAPY | Age: 50
Setting detail: THERAPIES SERIES
Discharge: HOME | End: 2025-04-08
Attending: ORTHOPAEDIC SURGERY
Payer: COMMERCIAL

## 2025-04-08 PROCEDURE — 97110 THERAPEUTIC EXERCISES: CPT | Performed by: PHYSICAL THERAPIST

## 2025-04-08 PROCEDURE — 97140 MANUAL THERAPY 1/> REGIONS: CPT | Performed by: PHYSICAL THERAPIST

## 2025-04-08 NOTE — FLOWSHEET NOTE
[facility] - Outpatient Rehabilitation and Therapy: [address], [place] [address] office: [phone] fax: [phone]     Physical Therapy Initial Evaluation Certification      Dear Sol Thakkar MD ,    We had the pleasure of evaluating the following patient for physical therapy services at [facility] Outpatient Physical Therapy.  A summary of our findings can be found in the initial assessment below.  This includes our plan of care.  If you have any questions or concerns regarding these findings, please do not hesitate to contact me at the office phone number listed above.  Thank you for the referral.     Physician Signature:_______________________________Date:__________________  By signing above (or electronic signature), therapist’s plan is approved by physician       Physical Therapy: TREATMENT/PROGRESS NOTE   Patient: Jose Guadalupe Lu (50 y.o. male)   Examination Date: 2025   :  1975 MRN: [id number]   Visit #: 3   Insurance Allowable Auth Needed   $25 copay   60 pcy, but needs auth [x]Yes    []No    Insurance: [place] / [place] / Product Type: *No Product type* /   Insurance ID: [id number] - ([facility])  Secondary Insurance (if applicable):    Treatment Diagnosis:     ICD-10-CM    1. Difficulty walking  R26.2       2. Acute postoperative pain of right hip  G89.18     M25.551          Medical Diagnosis:  Right hip pain [M25.551]   Referring Physician: Sol Thakkar MD  PCP: Ildefonso Gamez MD     Plan of care signed (Y/N):     Date of Patient follow up with Physician:      Plan of Care Report: NO  POC update due: (10 visits /OR AUTH LIMITS, whichever is less)  2025                                             Medical History:  Comorbidities:  Osteoarthritis  Relevant Medical History: shoulder surgery                                          Precautions/ Contra-indications:           Latex allergy:  NO  Pacemaker:

## 2025-04-16 ENCOUNTER — HOSPITAL ENCOUNTER (OUTPATIENT)
Dept: PHYSICAL THERAPY | Age: 50
Setting detail: THERAPIES SERIES
Discharge: HOME | End: 2025-04-16
Attending: ORTHOPAEDIC SURGERY
Payer: COMMERCIAL

## 2025-04-16 PROCEDURE — 97140 MANUAL THERAPY 1/> REGIONS: CPT | Performed by: PHYSICAL THERAPIST

## 2025-04-16 PROCEDURE — 97110 THERAPEUTIC EXERCISES: CPT | Performed by: PHYSICAL THERAPIST

## 2025-04-16 NOTE — FLOWSHEET NOTE
[facility] - Outpatient Rehabilitation and Therapy: [address], [place] [address] office: [phone] fax: [phone]     Physical Therapy Initial Evaluation Certification      Dear Sol Thakkar MD ,    We had the pleasure of evaluating the following patient for physical therapy services at [facility] Outpatient Physical Therapy.  A summary of our findings can be found in the initial assessment below.  This includes our plan of care.  If you have any questions or concerns regarding these findings, please do not hesitate to contact me at the office phone number listed above.  Thank you for the referral.     Physician Signature:_______________________________Date:__________________  By signing above (or electronic signature), therapist’s plan is approved by physician       Physical Therapy: TREATMENT/PROGRESS NOTE   Patient: Jose Guadalupe Lu (50 y.o. male)   Examination Date: 2025   :  1975 MRN: [id number]   Visit #: 4   Insurance Allowable Auth Needed   $25 copay   60 pcy, 10 until 25 [x]Yes    []No    Insurance: [place] / Product Type: *No Product type* /   Insurance ID: [id number] - ([facility])  Secondary Insurance (if applicable):    Treatment Diagnosis:     ICD-10-CM    1. Difficulty walking  R26.2       2. Acute postoperative pain of right hip  G89.18     M25.551          Medical Diagnosis:  Right hip pain [M25.551]   Referring Physician: Sol Thakkar MD  PCP: Ildefonso Gamez MD     Plan of care signed (Y/N):     Date of Patient follow up with Physician:      Plan of Care Report: NO  POC update due: (10 visits /OR AUTH LIMITS, whichever is less)  2025                                             Medical History:  Comorbidities:  Osteoarthritis  Relevant Medical History: shoulder surgery                                          Precautions/ Contra-indications:           Latex allergy:  NO  Pacemaker:

## 2025-04-21 ENCOUNTER — OFFICE VISIT (OUTPATIENT)
Dept: PRIMARY CARE CLINIC | Age: 50
End: 2025-04-21

## 2025-04-21 VITALS
HEIGHT: 78 IN | SYSTOLIC BLOOD PRESSURE: 126 MMHG | DIASTOLIC BLOOD PRESSURE: 72 MMHG | HEART RATE: 84 BPM | TEMPERATURE: 97.4 F | OXYGEN SATURATION: 98 % | BODY MASS INDEX: 24.16 KG/M2 | WEIGHT: 208.8 LBS | RESPIRATION RATE: 12 BRPM

## 2025-04-21 DIAGNOSIS — R07.9 CHEST PAIN, UNSPECIFIED TYPE: Primary | ICD-10-CM

## 2025-04-21 ASSESSMENT — ENCOUNTER SYMPTOMS
SHORTNESS OF BREATH: 0
HEMOPTYSIS: 0
VOMITING: 0
ABDOMINAL PAIN: 0

## 2025-04-21 NOTE — PROGRESS NOTES
Jose Guadalupe Lu (:  1975) is a 50 y.o. male,Established patient, here for evaluation of the following chief complaint(s):  Chest Pain (Patient woke up  with chest pain has went away as of now just wants to be on safe side not sure if it was gas or acid reflex states was here month ago and had EKG and it was normal )      Assessment & Plan  Chest pain, unspecified type   Ddx includes GERD vs angina  EKG in office normal  Will further evaluate with stress test as below, patient unable to exercise due to ongoing hip pain    Orders:    EKG 12 Lead    Stress echo (TTE) Dobutamine (PRN contrast/bubble/strain/3D); Future      No follow-ups on file.    Subjective       Chest Pain   This is a new problem. The current episode started yesterday. The onset quality is sudden. The problem has been resolved (resolved within 30 minutes). The pain is present in the substernal region and lateral region. The pain is at a severity of 5/10. The quality of the pain is described as heavy and dull. Pertinent negatives include no abdominal pain, dizziness, exertional chest pressure, fever, headaches, hemoptysis, irregular heartbeat, malaise/fatigue, near-syncope, numbness, palpitations, shortness of breath, syncope, vomiting or weakness.   His family medical history is significant for hyperlipidemia.   Pertinent negatives for family medical history include: no aortic dissection, no CAD, no heart disease and no hypertension.     Review of Systems   Constitutional:  Negative for fever and malaise/fatigue.   Respiratory:  Negative for hemoptysis and shortness of breath.    Cardiovascular:  Positive for chest pain. Negative for palpitations, syncope and near-syncope.   Gastrointestinal:  Negative for abdominal pain and vomiting.   Neurological:  Negative for dizziness, weakness, numbness and headaches.               Objective     /72 (BP Site: Left Upper Arm, Patient Position: Sitting, BP Cuff Size: Large Adult)   Pulse

## 2025-04-23 ENCOUNTER — HOSPITAL ENCOUNTER (OUTPATIENT)
Dept: PHYSICAL THERAPY | Age: 50
Setting detail: THERAPIES SERIES
Discharge: HOME | End: 2025-04-23
Attending: ORTHOPAEDIC SURGERY
Payer: COMMERCIAL

## 2025-04-23 PROCEDURE — 97112 NEUROMUSCULAR REEDUCATION: CPT | Performed by: PHYSICAL THERAPIST

## 2025-04-23 PROCEDURE — 97140 MANUAL THERAPY 1/> REGIONS: CPT | Performed by: PHYSICAL THERAPIST

## 2025-04-23 PROCEDURE — 97110 THERAPEUTIC EXERCISES: CPT | Performed by: PHYSICAL THERAPIST

## 2025-04-23 NOTE — FLOWSHEET NOTE
Mobilization  Modalities as needed that may include: Cryotherapy  Patient education on joint protection, postural re-education, activity modification, and progression of HEP    Plan: Cont POC- Continue emphasis/focus on exercise progression, improving proper muscle recruitment and activation/motor control patterns, and kinesthetic sense and proprioception. Next visit plan to continue current phase     Electronically Signed by Dina Ca, TIM  Date: 04/23/2025     Note: Portions of this note have been templated and/or copied from initial evaluation, reassessments and prior notes for documentation efficiency.    Note: If patient does not return for scheduled/recommended follow up visits, this note will serve as a discharge from care along with the most recent update on progress.

## 2025-04-29 ENCOUNTER — TELEPHONE (OUTPATIENT)
Dept: PRIMARY CARE CLINIC | Age: 50
End: 2025-04-29

## 2025-04-29 ENCOUNTER — HOSPITAL ENCOUNTER (OUTPATIENT)
Dept: PHYSICAL THERAPY | Age: 50
Setting detail: THERAPIES SERIES
Discharge: HOME | End: 2025-04-29
Attending: ORTHOPAEDIC SURGERY
Payer: COMMERCIAL

## 2025-04-29 DIAGNOSIS — I20.9 ANGINA PECTORIS: Primary | ICD-10-CM

## 2025-04-29 PROCEDURE — 97110 THERAPEUTIC EXERCISES: CPT | Performed by: PHYSICAL THERAPIST

## 2025-04-29 PROCEDURE — 97112 NEUROMUSCULAR REEDUCATION: CPT | Performed by: PHYSICAL THERAPIST

## 2025-04-29 PROCEDURE — 97140 MANUAL THERAPY 1/> REGIONS: CPT | Performed by: PHYSICAL THERAPIST

## 2025-04-29 NOTE — FLOWSHEET NOTE
[facility] - Outpatient Rehabilitation and Therapy: [address], [place] [address] office: [phone] fax: [phone]     Physical Therapy Initial Evaluation Certification      Dear Slo Thakkar MD ,    We had the pleasure of evaluating the following patient for physical therapy services at [facility] Outpatient Physical Therapy.  A summary of our findings can be found in the initial assessment below.  This includes our plan of care.  If you have any questions or concerns regarding these findings, please do not hesitate to contact me at the office phone number listed above.  Thank you for the referral.     Physician Signature:_______________________________Date:__________________  By signing above (or electronic signature), therapist’s plan is approved by physician       Physical Therapy: TREATMENT/PROGRESS NOTE   Patient: Jose Guadalupe Lu (50 y.o. male)   Examination Date: 2025   :  1975 MRN: [id number]   Visit #: 6   Insurance Allowable Auth Needed   $25 copay   60 pcy, 10 until 25 [x]Yes    []No    Insurance: [place] / [place] / Product Type: *No Product type* /   Insurance ID: [id number] - ([facility])  Secondary Insurance (if applicable):    Treatment Diagnosis:     ICD-10-CM    1. Difficulty walking  R26.2       2. Acute postoperative pain of right hip  G89.18     M25.551          Medical Diagnosis:  Right hip pain [M25.551]   Referring Physician: Sol Thakkar MD  PCP: Ildefonso Gamez MD     Plan of care signed (Y/N):     Date of Patient follow up with Physician:      Plan of Care Report: NO  POC update due: (10 visits /OR AUTH LIMITS, whichever is less)  2025                                             Medical History:  Comorbidities:  Osteoarthritis  Relevant Medical History: shoulder surgery                                          Precautions/ Contra-indications:           Latex allergy:  NO  Pacemaker:

## 2025-04-29 NOTE — TELEPHONE ENCOUNTER
[facility] stress lab is calling on the patient and asking the doctor that put the order in for the stress echo dobutamine they need it verify that if it is a stress echo - stress echo dobutamine . [facility] stress lab is asking if you can put in a new order .    [facility] stress lab   Ph:[phone]

## 2025-04-30 NOTE — TELEPHONE ENCOUNTER
Order changed to nuclear stress test with [facility], discussed with stress lab to reschedule patient if needed or adjust procedure as already scheduled.    Patient cannot tolerate exercise stress test due to ongoing hip pain.

## 2025-05-01 ENCOUNTER — OFFICE VISIT (OUTPATIENT)
Dept: ORTHOPEDIC SURGERY | Age: 50
End: 2025-05-01

## 2025-05-01 VITALS — BODY MASS INDEX: 23.72 KG/M2 | HEIGHT: 78 IN | WEIGHT: 205 LBS

## 2025-05-01 DIAGNOSIS — Z96.641 STATUS POST HIP REPLACEMENT, RIGHT: Primary | ICD-10-CM

## 2025-05-01 DIAGNOSIS — M16.11 PRIMARY OSTEOARTHRITIS OF RIGHT HIP: ICD-10-CM

## 2025-05-01 PROCEDURE — 99024 POSTOP FOLLOW-UP VISIT: CPT | Performed by: ORTHOPAEDIC SURGERY

## 2025-05-01 NOTE — PROGRESS NOTES
History of Present Illness:  Jose Guadalupe Lu is a pleasant 50 y.o. male who presents for a post operative visit. He is 6 weeks out following a right total hip arthroplasty, direct anterior approach. Overall He is doing well, he does not have any pain in his hip that he had preoperatively and is not taking any pain medication.  He still feels a little stiff in the right hip but has been working with physical therapy and is felt good gains with Dina at the [place] office.     He denies fevers, chills, numbness, tingling, and shortness of breath.    Medical History:  Patient's medications, allergies, past medical, surgical, social and family histories were reviewed and updated as appropriate.    No notes on file    Review of Systems  A 14 point review of systems was completed by the patient and is available in the media section of the scanned medical record and was reviewed on 5/1/2025.      Vital Signs:  There were no vitals filed for this visit.    General/Appearance: Alert and oriented and in no apparent distress.    Skin:  There are no skin lesions, cellulitis, or extreme edema. The patient has warm and well-perfused Bilateral lower  extremities with brisk capillary refill.      Hip  Exam: right    Inspection: Hip incision(s)  are clean, dry and intact and well approximated. TThere is no erythema, drainage or other signs of infection, there is a small healing scab at the proximal aspect of the incision    Palpation:  No crepitus to gentle motion / circumduction of the hip    Active Range of Motion: can SLR, can bend his hip 0 -100    Passive Range of Motion: 0-110, no pain    Strength:  WNL     Special Tests:  Deferred.    Neurovascular: Sensation to light touch is intact, no motor deficits, palpable radial pulses 2+    Radiology:     Plain radiographs of the right hip comprising 3 views (AP Pelvis, Navas View and False Profile view of the right hip) were obtained and reviewed in the office: Shows postsurgical

## 2025-05-06 ENCOUNTER — HOSPITAL ENCOUNTER (OUTPATIENT)
Age: 50
Discharge: HOME | End: 2025-05-08
Payer: COMMERCIAL

## 2025-05-06 ENCOUNTER — APPOINTMENT (OUTPATIENT)
Dept: PHYSICAL THERAPY | Age: 50
End: 2025-05-06
Attending: ORTHOPAEDIC SURGERY
Payer: COMMERCIAL

## 2025-05-06 ENCOUNTER — HOSPITAL ENCOUNTER (OUTPATIENT)
Dept: NUCLEAR MEDICINE | Age: 50
Discharge: HOME | End: 2025-05-06
Payer: COMMERCIAL

## 2025-05-06 DIAGNOSIS — I20.9 ANGINA PECTORIS: ICD-10-CM

## 2025-05-06 LAB
NUC STRESS EJECTION FRACTION: 58 %
NUC STRESS LV EDV: 130 ML (ref 67–155)
NUC STRESS LV ESV: 54 ML (ref 22–58)
NUC STRESS LV MASS: 156 G
STRESS BASELINE DIAS BP: 86 MMHG
STRESS BASELINE HR: 57 BPM
STRESS BASELINE SYS BP: 135 MMHG
STRESS ESTIMATED WORKLOAD: 1 METS
STRESS PEAK DIAS BP: 88 MMHG
STRESS PEAK SYS BP: 146 MMHG
STRESS PERCENT HR ACHIEVED: 58 %
STRESS POST PEAK HR: 99 BPM
STRESS RATE PRESSURE PRODUCT: NORMAL BPM*MMHG
STRESS TARGET HR: 170 BPM

## 2025-05-06 PROCEDURE — 3430000000 HC RX DIAGNOSTIC RADIOPHARMACEUTICAL

## 2025-05-06 PROCEDURE — 78452 HT MUSCLE IMAGE SPECT MULT: CPT | Performed by: INTERNAL MEDICINE

## 2025-05-06 PROCEDURE — 93018 CV STRESS TEST I&R ONLY: CPT | Performed by: INTERNAL MEDICINE

## 2025-05-06 PROCEDURE — A9502 TC99M TETROFOSMIN: HCPCS

## 2025-05-06 PROCEDURE — 78452 HT MUSCLE IMAGE SPECT MULT: CPT

## 2025-05-06 PROCEDURE — 6360000002 HC RX W HCPCS

## 2025-05-06 PROCEDURE — 93017 CV STRESS TEST TRACING ONLY: CPT

## 2025-05-06 PROCEDURE — 93016 CV STRESS TEST SUPVJ ONLY: CPT | Performed by: INTERNAL MEDICINE

## 2025-05-06 RX ORDER — REGADENOSON 0.08 MG/ML
0.4 INJECTION, SOLUTION INTRAVENOUS
Status: COMPLETED | OUTPATIENT
Start: 2025-05-06 | End: 2025-05-06

## 2025-05-06 RX ADMIN — TETROFOSMIN 10.5 MILLICURIE: 1.38 INJECTION, POWDER, LYOPHILIZED, FOR SOLUTION INTRAVENOUS at 08:25

## 2025-05-06 RX ADMIN — TETROFOSMIN 32.6 MILLICURIE: 1.38 INJECTION, POWDER, LYOPHILIZED, FOR SOLUTION INTRAVENOUS at 12:01

## 2025-05-06 RX ADMIN — REGADENOSON 0.4 MG: 0.08 INJECTION, SOLUTION INTRAVENOUS at 12:02

## 2025-05-08 ENCOUNTER — RESULTS FOLLOW-UP (OUTPATIENT)
Dept: INTERNAL MEDICINE CLINIC | Age: 50
End: 2025-05-08

## 2025-05-08 DIAGNOSIS — R94.39 ABNORMAL STRESS TEST: Primary | ICD-10-CM

## 2025-05-09 ENCOUNTER — HOSPITAL ENCOUNTER (OUTPATIENT)
Dept: PHYSICAL THERAPY | Age: 50
Setting detail: THERAPIES SERIES
Discharge: HOME | End: 2025-05-09
Attending: ORTHOPAEDIC SURGERY
Payer: COMMERCIAL

## 2025-05-09 PROCEDURE — 97112 NEUROMUSCULAR REEDUCATION: CPT | Performed by: PHYSICAL THERAPIST

## 2025-05-09 PROCEDURE — 97110 THERAPEUTIC EXERCISES: CPT | Performed by: PHYSICAL THERAPIST

## 2025-05-09 NOTE — FLOWSHEET NOTE
[facility] - Outpatient Rehabilitation and Therapy: [address], [place] [address] office: [phone] fax: [phone]     Physical Therapy Initial Evaluation Certification      Dear Sol Thakkar MD ,    We had the pleasure of evaluating the following patient for physical therapy services at [facility] Out[facility] Physical Therapy.  A summary of our findings can be found in the initial assessment below.  This includes our plan of care.  If you have any questions or concerns regarding these findings, please do not hesitate to contact me at the office phone number listed above.  Thank you for the referral.     Physician Signature:_______________________________Date:__________________  By signing above (or electronic signature), therapist’s plan is approved by physician       Physical Therapy: TREATMENT/PROGRESS NOTE   Patient: Jose Guadalupe Lu (50 y.o. male)   Examination Date: 2025   :  1975 MRN: [id number]   Visit #: 7   Insurance Allowable Auth Needed   $25 copay   60 pcy, 10 until 25 [x]Yes    []No    Insurance: [place] / [place] / Product Type: *No Product type* /   Insurance ID: [id number] - ([facility])  Secondary Insurance (if applicable):    Treatment Diagnosis:     ICD-10-CM    1. Difficulty walking  R26.2       2. Acute postoperative pain of right hip  G89.18     M25.551          Medical Diagnosis:  Right hip pain [M25.551]   Referring Physician: Sol Thakkar MD  PCP: Ildefonso Gamez MD     Plan of care signed (Y/N):     Date of Patient follow up with Physician:      Plan of Care Report: YES, Date Range for this report: 3/26/25 to 25  POC update due: (10 visits /OR AUTH LIMITS, whichever is less)  2025                                             Medical History:  Comorbidities:  Osteoarthritis  Relevant Medical History: shoulder surgery                                          Precautions/

## 2025-05-13 ENCOUNTER — HOSPITAL ENCOUNTER (OUTPATIENT)
Dept: PHYSICAL THERAPY | Age: 50
Setting detail: THERAPIES SERIES
Discharge: HOME | End: 2025-05-13
Attending: ORTHOPAEDIC SURGERY
Payer: COMMERCIAL

## 2025-05-13 PROCEDURE — 97112 NEUROMUSCULAR REEDUCATION: CPT | Performed by: PHYSICAL THERAPIST

## 2025-05-13 PROCEDURE — 97110 THERAPEUTIC EXERCISES: CPT | Performed by: PHYSICAL THERAPIST

## 2025-05-13 NOTE — FLOWSHEET NOTE
Yes  [] No      CHARGE CAPTURE     PT CHARGE GRID   CPT Code (TIMED) minutes # CPT Code (UNTIMED) #     Therex (04040)  22 1  EVAL:LOW (92330 - Typically 20 minutes face-to-face)     Neuromusc. Re-ed (11866) 16 1  Re-Eval (43057)     Manual (32368) 5 0  Estim Unattended (68060)     Ther. Act (36042)    Mech. Traction (61599)     Gait (98251)    Dry Needle 1-2 muscle (37161)     Aquatic Therex (28140)    Dry Needle 3+ muscle (97134)     Iontophoresis (90708)    VASO (99238)     Ultrasound (79658)    Group Therapy (77523)     Estim Attended (82429)    Canalith Repositioning (05627)     Physical Performance Test (30773)    Custom orthotic ()     Other:    Other:    Total Timed Code Tx Minutes 43 3       Total Treatment Minutes 43        Charge Justification:  (51189) THERAPEUTIC EXERCISE - Provided verbal/tactile cueing for HEP and/or activities related to strengthening, flexibility, endurance, ROM performed to prevent loss of range of motion, maintain or improve muscular strength or increase flexibility, following either an injury or surgery.   (45861) NEUROMUSCULAR RE-EDUCATION - Provided therapeutic procedure on activities related to neuromuscular reeducation of movement, balance, coordination, kinesthetic sense, posture, and/or proprioception for sitting and/or standing activities. Provided HEP review and/or progression.    GOALS     Patient stated goal: Return to normal life  [] Progressing: [] Met: [] Not Met: [] Adjusted    Therapist goals for Patient:   Short Term Goals: To be achieved in: 2 weeks  1Independent in HEP and progression per patient tolerance, in order to prevent re-injury.   [x] Progressing: [] Met: [] Not Met: [] Adjusted  Patient will have a decrease in pain to <1/10 to facilitate improvement in movement, function, and ADLs as indicated by Functional Deficits.  [x] Progressing: [] Met: [] Not Met: [] Adjusted      Long Term Goals: To be achieved in: 12 weeks  Disability index score of 20%

## 2025-05-16 ENCOUNTER — APPOINTMENT (OUTPATIENT)
Dept: PHYSICAL THERAPY | Age: 50
End: 2025-05-16
Attending: ORTHOPAEDIC SURGERY
Payer: COMMERCIAL

## 2025-05-27 ENCOUNTER — HOSPITAL ENCOUNTER (OUTPATIENT)
Dept: PHYSICAL THERAPY | Age: 50
Setting detail: THERAPIES SERIES
Discharge: HOME | End: 2025-05-27
Attending: ORTHOPAEDIC SURGERY
Payer: COMMERCIAL

## 2025-05-27 PROCEDURE — 97110 THERAPEUTIC EXERCISES: CPT | Performed by: PHYSICAL THERAPIST

## 2025-05-27 PROCEDURE — 97140 MANUAL THERAPY 1/> REGIONS: CPT | Performed by: PHYSICAL THERAPIST

## 2025-05-27 PROCEDURE — 97112 NEUROMUSCULAR REEDUCATION: CPT | Performed by: PHYSICAL THERAPIST

## 2025-05-27 NOTE — FLOWSHEET NOTE
function with self care, mobility, lifting and ambulation    GOALS     Patient stated goal: Return to normal life  [] Progressing: [] Met: [] Not Met: [] Adjusted    Therapist goals for Patient:   Short Term Goals: To be achieved in: 2 weeks  1Independent in HEP and progression per patient tolerance, in order to prevent re-injury.   [x] Progressing: [] Met: [] Not Met: [] Adjusted  Patient will have a decrease in pain to <1/10 to facilitate improvement in movement, function, and ADLs as indicated by Functional Deficits.  [x] Progressing: [] Met: [] Not Met: [] Adjusted      Long Term Goals: To be achieved in: 12 weeks  Disability index score of 20% or less for the WOMAC to assist with reaching prior level of function with activities such as ADLs, household chores, and lawn care.  [x] Progressing: [] Met: [] Not Met: [] Adjusted  Patient will demonstrate increased AROM of right hip to equal that of left hip without pain to allow for proper joint functioning to enable patient to dress and get in/ out of shower without restriction. .   [x] Progressing: [] Met: [] Not Met: [] Adjusted  Patient will demonstrate increased Strength of right LE to within 5lb with HHD of contralateral limb to allow for proper functional mobility to enable patient to return to ascending descending stairs with reciprocal gait.   [x] Progressing: [] Met: [] Not Met: [] Adjusted  Patient will return to working full day without increased symptoms or restriction.   [] Progressing: [] Met: [] Not Met: [] Adjusted  Able to sleep 7-8 hours.     [x] Progressing: [] Met: [] Not Met: [] Adjusted         Overall Progression Towards Functional goals/ Treatment Progress Update:  [x] Patient is progressing as expected towards functional goals listed.    [] Progression is slowed due to complexities/Impairments listed.  [] Progression has been slowed due to co-morbidities.  [] Plan just implemented, too soon (<30days) to assess goals progression   [] Goals

## 2025-06-10 ENCOUNTER — HOSPITAL ENCOUNTER (OUTPATIENT)
Dept: PHYSICAL THERAPY | Age: 50
Setting detail: THERAPIES SERIES
Discharge: HOME | End: 2025-06-10
Attending: ORTHOPAEDIC SURGERY
Payer: COMMERCIAL

## 2025-06-10 PROCEDURE — 97112 NEUROMUSCULAR REEDUCATION: CPT | Performed by: PHYSICAL THERAPIST

## 2025-06-10 PROCEDURE — 97140 MANUAL THERAPY 1/> REGIONS: CPT | Performed by: PHYSICAL THERAPIST

## 2025-06-10 PROCEDURE — 97110 THERAPEUTIC EXERCISES: CPT | Performed by: PHYSICAL THERAPIST

## 2025-06-10 NOTE — FLOWSHEET NOTE
[facility] - Outpatient Rehabilitation and Therapy: [address], [place] [address] office: [phone] fax: [phone]     Physical Therapy Initial Evaluation Certification      Dear Sol Thakkar MD ,    We had the pleasure of evaluating the following patient for physical therapy services at [facility].  A summary of our findings can be found in the initial assessment below.  This includes our plan of care.  If you have any questions or concerns regarding these findings, please do not hesitate to contact me at the office phone number listed above.  Thank you for the referral.     Physician Signature:_______________________________Date:__________________  By signing above (or electronic signature), therapist’s plan is approved by physician       Physical Therapy: TREATMENT/PROGRESS NOTE   Patient: Jose Guadalupe Lu (50 y.o. male)   Examination Date: 06/10/2025   :  1975 MRN: [id number]   Visit #: 10   Insurance Allowable Auth Needed   $25 copay   10 until 25 [x]Yes    []No    Insurance: Pay[place] BC[place] / Plan[place] / Product Type: *No Product type* /   Insurance ID: [id number] - ([facility])  Secondary Insurance (if applicable):    Treatment Diagnosis:     ICD-10-CM    1. Difficulty walking  R26.2       2. Acute postoperative pain of right hip  G89.18     M25.551          Medical Diagnosis:  Right hip pain [M25.551]   Referring Physician: Sol Thakkar MD  PCP: Ildefonso Gamez MD     Plan of care signed (Y/N):     Date of Patient follow up with Physician: 25     Plan of Care Report: NO  POC update due: (10 visits /OR AUTH LIMITS, whichever is less)  2025                                             Medical History:  Comorbidities:  Osteoarthritis  Relevant Medical History: shoulder surgery                                          Precautions/ Contra-indications:           Latex allergy:  NO  Pacemaker:

## 2025-06-24 ENCOUNTER — HOSPITAL ENCOUNTER (OUTPATIENT)
Dept: PHYSICAL THERAPY | Age: 50
Setting detail: THERAPIES SERIES
Discharge: HOME | End: 2025-06-24
Attending: ORTHOPAEDIC SURGERY
Payer: COMMERCIAL

## 2025-06-24 PROCEDURE — 97110 THERAPEUTIC EXERCISES: CPT | Performed by: PHYSICAL THERAPIST

## 2025-06-24 PROCEDURE — 97112 NEUROMUSCULAR REEDUCATION: CPT | Performed by: PHYSICAL THERAPIST

## 2025-06-24 NOTE — FLOWSHEET NOTE
[facility] - Outpatient Rehabilitation and Therapy: [address], [place] [address] office: [phone] fax: [phone]     Physical Therapy Initial Evaluation Certification      Dear Sol Thakkar MD ,    We had the pleasure of evaluating the following patient for physical therapy services at [facility].  A summary of our findings can be found in the initial assessment below.  This includes our plan of care.  If you have any questions or concerns regarding these findings, please do not hesitate to contact me at the office phone number listed above.  Thank you for the referral.     Physician Signature:_______________________________Date:__________________  By signing above (or electronic signature), therapist’s plan is approved by physician       Physical Therapy: TREATMENT/PROGRESS NOTE   Patient: Jose Guadalupe Lu (50 y.o. male)   Examination Date: 2025   :  1975 MRN: [id number]   Visit #: 11   Insurance Allowable Auth Needed   $25 copay   10 until 25  additional 5 until  25 [x]Yes    []No    Insurance: [place] / [place] / Product Type: *No Product type* /   Insurance ID: [id number] - ([facility])  Secondary Insurance (if applicable):    Treatment Diagnosis:     ICD-10-CM    1. Difficulty walking  R26.2       2. Acute postoperative pain of right hip  G89.18     M25.551          Medical Diagnosis:  Right hip pain [M25.551]   Referring Physician: Sol Thakkar MD  PCP: Ildefonso Gamez MD     Plan of care signed (Y/N):     Date of Patient follow up with Physician: 25     Plan of Care Report: NO  POC update due: (10 visits /OR AUTH LIMITS, whichever is less)  2025                                             Medical History:  Comorbidities:  Osteoarthritis  Relevant Medical History: shoulder surgery                                          Precautions/ Contra-indications:

## 2025-07-21 ENCOUNTER — HOSPITAL ENCOUNTER (OUTPATIENT)
Dept: PHYSICAL THERAPY | Age: 50
Setting detail: THERAPIES SERIES
Discharge: HOME | End: 2025-07-21
Attending: ORTHOPAEDIC SURGERY
Payer: COMMERCIAL

## 2025-07-21 PROCEDURE — 97112 NEUROMUSCULAR REEDUCATION: CPT | Performed by: PHYSICAL THERAPIST

## 2025-07-21 PROCEDURE — 97110 THERAPEUTIC EXERCISES: CPT | Performed by: PHYSICAL THERAPIST

## 2025-07-21 NOTE — FLOWSHEET NOTE
mobility  Neuromuscular Re-education (13542) activation and proprioception, including postural re-education.    Gait Training (37517) for normalization of ambulation patterns and AD training.   Manual Therapy (49352) as indicated to include: Passive Range of Motion, Grade V Manipulation, and Soft Tissue Mobilization  Modalities as needed that may include: Cryotherapy  Patient education on joint protection, postural re-education, activity modification, and progression of HEP    Plan: Cont POC- Continue emphasis/focus on exercise progression, improving proper muscle recruitment and activation/motor control patterns, and kinesthetic sense and proprioception. Next visit plan to continue current phase     Electronically Signed by Dina Ca, TIM  Date: 07/21/2025     Note: Portions of this note have been templated and/or copied from initial evaluation, reassessments and prior notes for documentation efficiency.    Note: If patient does not return for scheduled/recommended follow up visits, this note will serve as a discharge from care along with the most recent update on progress.

## 2025-07-30 PROBLEM — R07.89 OTHER CHEST PAIN: Status: ACTIVE | Noted: 2025-07-30

## 2025-07-31 ENCOUNTER — OFFICE VISIT (OUTPATIENT)
Dept: ORTHOPEDIC SURGERY | Age: 50
End: 2025-07-31
Payer: COMMERCIAL

## 2025-07-31 VITALS — WEIGHT: 215 LBS | HEIGHT: 78 IN | BODY MASS INDEX: 24.88 KG/M2

## 2025-07-31 DIAGNOSIS — Z96.641 STATUS POST HIP REPLACEMENT, RIGHT: Primary | ICD-10-CM

## 2025-07-31 DIAGNOSIS — M16.11 PRIMARY OSTEOARTHRITIS OF RIGHT HIP: ICD-10-CM

## 2025-07-31 PROCEDURE — 99213 OFFICE O/P EST LOW 20 MIN: CPT

## 2025-07-31 NOTE — PROGRESS NOTES
History of Present Illness:  Jose Guadalupe Lu is a pleasant 50 y.o. male who presents for a post operative visit. He is 4 months out following a right total hip arthroplasty, direct anterior approach. Overall He is doing excellent. He has returned to full activity without difficulty. He has finished PT at [place]. He recently had some back pain treated with chiropractor treatments and that is not subsided.      He denies injuries or setbacks.     Medical History:  Patient's medications, allergies, past medical, surgical, social and family histories were reviewed and updated as appropriate.    No notes on file    Review of Systems  A 14 point review of systems was completed by the patient and is available in the media section of the scanned medical record and was reviewed on 7/31/2025.      Vital Signs:  There were no vitals filed for this visit.    General/Appearance: Alert and oriented and in no apparent distress.    Skin:  There are no skin lesions, cellulitis, or extreme edema. The patient has warm and well-perfused Bilateral lower  extremities with brisk capillary refill.      Hip  Exam: right    Inspection: Hip incision(s)  are well healed. There is no erythema, drainage or other signs of infection    Palpation:  No crepitus to gentle motion / circumduction of the hip    Active Range of Motion: can SLR, can bend his hip 0 -120    Passive Range of Motion: 0-120, no pain, 45 ER, 25 IR    Strength:  abduction 5/5/ SLR 5/5.    Special Tests: great squats. Good alternating lunges, with some external rotation.    Neurovascular: Sensation to light touch is intact, no motor deficits, palpable radial pulses 2+    Radiology:     Plain radiographs of the right hip comprising 3 views (AP Pelvis, Navas View and False Profile view of the right hip) were obtained and reviewed in the office: Shows postsurgical changes from the right hip arthroplasty. No evidence of acute fracture or complications.    Impression: Stable postop

## 2025-08-04 ENCOUNTER — HOSPITAL ENCOUNTER (OUTPATIENT)
Dept: PHYSICAL THERAPY | Age: 50
Setting detail: THERAPIES SERIES
Discharge: HOME | End: 2025-08-04
Attending: ORTHOPAEDIC SURGERY
Payer: COMMERCIAL

## 2025-08-04 PROCEDURE — 97110 THERAPEUTIC EXERCISES: CPT | Performed by: PHYSICAL THERAPIST

## 2025-08-04 PROCEDURE — 97112 NEUROMUSCULAR REEDUCATION: CPT | Performed by: PHYSICAL THERAPIST

## 2025-08-05 ENCOUNTER — OFFICE VISIT (OUTPATIENT)
Dept: CARDIOLOGY CLINIC | Age: 50
End: 2025-08-05
Payer: COMMERCIAL

## 2025-08-05 VITALS
WEIGHT: 215 LBS | DIASTOLIC BLOOD PRESSURE: 68 MMHG | OXYGEN SATURATION: 98 % | SYSTOLIC BLOOD PRESSURE: 124 MMHG | HEART RATE: 73 BPM | HEIGHT: 78 IN | BODY MASS INDEX: 24.88 KG/M2

## 2025-08-05 DIAGNOSIS — R07.9 CHEST PAIN, UNSPECIFIED TYPE: ICD-10-CM

## 2025-08-05 DIAGNOSIS — E78.00 PURE HYPERCHOLESTEROLEMIA: ICD-10-CM

## 2025-08-05 DIAGNOSIS — R07.89 OTHER CHEST PAIN: Primary | ICD-10-CM

## 2025-08-05 DIAGNOSIS — Z79.899 MEDICATION MANAGEMENT: ICD-10-CM

## 2025-08-05 PROCEDURE — 99204 OFFICE O/P NEW MOD 45 MIN: CPT | Performed by: INTERNAL MEDICINE

## 2025-08-05 RX ORDER — NITROGLYCERIN 0.4 MG/1
0.8 TABLET SUBLINGUAL
OUTPATIENT
Start: 2025-08-05

## 2025-08-05 RX ORDER — SODIUM CHLORIDE 0.9 % (FLUSH) 0.9 %
5-40 SYRINGE (ML) INJECTION EVERY 12 HOURS SCHEDULED
OUTPATIENT
Start: 2025-08-05

## 2025-08-05 RX ORDER — SODIUM CHLORIDE 0.9 % (FLUSH) 0.9 %
5-40 SYRINGE (ML) INJECTION PRN
OUTPATIENT
Start: 2025-08-05

## 2025-08-05 RX ORDER — NITROGLYCERIN 0.4 MG/1
0.4 TABLET SUBLINGUAL
OUTPATIENT
Start: 2025-08-05

## 2025-08-05 RX ORDER — METOPROLOL TARTRATE 50 MG
TABLET ORAL
Qty: 3 TABLET | Refills: 0 | Status: SHIPPED | OUTPATIENT
Start: 2025-08-05

## 2025-08-05 RX ORDER — METOPROLOL TARTRATE 1 MG/ML
5 INJECTION, SOLUTION INTRAVENOUS EVERY 5 MIN PRN
OUTPATIENT
Start: 2025-08-05

## 2025-08-05 RX ORDER — SODIUM CHLORIDE 9 MG/ML
INJECTION, SOLUTION INTRAVENOUS PRN
OUTPATIENT
Start: 2025-08-05

## 2025-08-07 DIAGNOSIS — Z79.899 MEDICATION MANAGEMENT: ICD-10-CM

## 2025-08-08 ENCOUNTER — HOSPITAL ENCOUNTER (OUTPATIENT)
Dept: PHYSICAL THERAPY | Age: 50
Setting detail: THERAPIES SERIES
Discharge: HOME | End: 2025-08-08
Attending: ORTHOPAEDIC SURGERY
Payer: COMMERCIAL

## 2025-08-08 LAB
ALBUMIN SERPL-MCNC: 4.5 G/DL (ref 3.4–5)
ALP SERPL-CCNC: 68 U/L (ref 40–129)
ALT SERPL-CCNC: 35 U/L (ref 10–40)
AST SERPL-CCNC: 31 U/L (ref 15–37)
BILIRUB DIRECT SERPL-MCNC: 0.2 MG/DL (ref 0–0.3)
BILIRUB INDIRECT SERPL-MCNC: 0.4 MG/DL (ref 0–1)
BILIRUB SERPL-MCNC: 0.6 MG/DL (ref 0–1)
CHOLEST SERPL-MCNC: 227 MG/DL (ref 0–199)
HDLC SERPL-MCNC: 62 MG/DL (ref 40–60)
LDL CHOLESTEROL: 147 MG/DL
PROT SERPL-MCNC: 7.2 G/DL (ref 6.4–8.2)
TRIGL SERPL-MCNC: 89 MG/DL (ref 0–150)
VLDLC SERPL CALC-MCNC: 18 MG/DL

## 2025-08-08 PROCEDURE — 97110 THERAPEUTIC EXERCISES: CPT | Performed by: PHYSICAL THERAPIST

## 2025-08-08 PROCEDURE — 97112 NEUROMUSCULAR REEDUCATION: CPT | Performed by: PHYSICAL THERAPIST

## 2025-08-09 ENCOUNTER — PATIENT MESSAGE (OUTPATIENT)
Dept: CARDIOLOGY CLINIC | Age: 50
End: 2025-08-09

## 2025-08-18 ENCOUNTER — APPOINTMENT (OUTPATIENT)
Dept: PHYSICAL THERAPY | Age: 50
End: 2025-08-18
Attending: ORTHOPAEDIC SURGERY
Payer: COMMERCIAL

## 2025-08-26 ENCOUNTER — HOSPITAL ENCOUNTER (OUTPATIENT)
Dept: CARDIOLOGY | Age: 50
Discharge: HOME | End: 2025-08-28
Attending: INTERNAL MEDICINE
Payer: COMMERCIAL

## 2025-08-26 VITALS
SYSTOLIC BLOOD PRESSURE: 124 MMHG | BODY MASS INDEX: 24.99 KG/M2 | DIASTOLIC BLOOD PRESSURE: 68 MMHG | WEIGHT: 216 LBS | HEIGHT: 78 IN

## 2025-08-26 DIAGNOSIS — R07.9 CHEST PAIN, UNSPECIFIED TYPE: ICD-10-CM

## 2025-08-26 LAB
ECHO AO ASC DIAM: 3.2 CM
ECHO AO ASCENDING AORTA INDEX: 1.37 CM/M2
ECHO AO ROOT DIAM: 2.9 CM
ECHO AO ROOT INDEX: 1.24 CM/M2
ECHO AV CUSP MM: 1.6 CM
ECHO AV MEAN GRADIENT: 4 MMHG
ECHO AV MEAN VELOCITY: 0.9 M/S
ECHO AV PEAK GRADIENT: 7 MMHG
ECHO AV PEAK GRADIENT: 7 MMHG
ECHO AV PEAK VELOCITY: 1.3 M/S
ECHO AV VELOCITY RATIO: 0.85
ECHO AV VTI: 31.1 CM
ECHO BSA: 2.32 M2
ECHO EST RA PRESSURE: 3 MMHG
ECHO IVC PROX: 2.4 CM
ECHO LA AREA 2C: 23.2 CM2
ECHO LA AREA 4C: 20 CM2
ECHO LA DIAMETER INDEX: 1.37 CM/M2
ECHO LA DIAMETER: 3.2 CM
ECHO LA MAJOR AXIS: 6.2 CM
ECHO LA MINOR AXIS: 6.2 CM
ECHO LA TO AORTIC ROOT RATIO: 1.1
ECHO LA VOL BP: 61 ML (ref 18–58)
ECHO LA VOL MOD A2C: 72 ML (ref 18–58)
ECHO LA VOL MOD A4C: 51 ML (ref 18–58)
ECHO LA VOL/BSA BIPLANE: 26 ML/M2 (ref 16–34)
ECHO LA VOLUME INDEX MOD A2C: 31 ML/M2 (ref 16–34)
ECHO LA VOLUME INDEX MOD A4C: 22 ML/M2 (ref 16–34)
ECHO LV E' LATERAL VELOCITY: 17.3 CM/S
ECHO LV E' SEPTAL VELOCITY: 15.1 CM/S
ECHO LV EDV 3D: 154 ML
ECHO LV EDV INDEX 3D: 66 ML/M2
ECHO LV EF PHYSICIAN: 60 %
ECHO LV EJECTION FRACTION 3D: 59 %
ECHO LV ESV 3D: 63 ML
ECHO LV ESV INDEX 3D: 27 ML/M2
ECHO LV FRACTIONAL SHORTENING: 33 % (ref 28–44)
ECHO LV GLOBAL LONGITUDINAL STRAIN (GLS): -15.4 %
ECHO LV GLOBAL LONGITUDINAL STRAIN (GLS): -19.2 %
ECHO LV GLOBAL LONGITUDINAL STRAIN (GLS): -19.9 %
ECHO LV GLOBAL LONGITUDINAL STRAIN (GLS): -22.4 %
ECHO LV INTERNAL DIMENSION DIASTOLE INDEX: 2.23 CM/M2
ECHO LV INTERNAL DIMENSION DIASTOLIC: 5.2 CM (ref 4.2–5.9)
ECHO LV INTERNAL DIMENSION SYSTOLIC INDEX: 1.5 CM/M2
ECHO LV INTERNAL DIMENSION SYSTOLIC: 3.5 CM
ECHO LV ISOVOLUMETRIC RELAXATION TIME (IVRT): 74 MS
ECHO LV IVSD: 1 CM (ref 0.6–1)
ECHO LV MASS 2D: 181.4 G (ref 88–224)
ECHO LV MASS 3D INDEX: 134.8 G/M2
ECHO LV MASS 3D: 314 G
ECHO LV MASS INDEX 2D: 77.9 G/M2 (ref 49–115)
ECHO LV POSTERIOR WALL DIASTOLIC: 0.9 CM (ref 0.6–1)
ECHO LV RELATIVE WALL THICKNESS RATIO: 0.35
ECHO LVOT AV VTI INDEX: 0.8
ECHO LVOT MEAN GRADIENT: 2 MMHG
ECHO LVOT PEAK GRADIENT: 5 MMHG
ECHO LVOT PEAK VELOCITY: 1.1 M/S
ECHO LVOT VTI: 25 CM
ECHO MV A VELOCITY: 0.4 M/S
ECHO MV E DECELERATION TIME (DT): 206 MS
ECHO MV E VELOCITY: 0.77 M/S
ECHO MV E/A RATIO: 1.93
ECHO MV E/E' LATERAL: 4.45
ECHO MV E/E' RATIO (AVERAGED): 4.78
ECHO MV E/E' SEPTAL: 5.1
ECHO RA AREA 4C: 19.5 CM2
ECHO RA END SYSTOLIC VOLUME APICAL 4 CHAMBER INDEX BSA: 24 ML/M2
ECHO RA VOLUME: 55 ML
ECHO RIGHT VENTRICULAR SYSTOLIC PRESSURE (RVSP): 26 MMHG
ECHO RV FREE WALL PEAK S': 14.9 CM/S
ECHO RV TAPSE: 2.5 CM (ref 1.7–?)
ECHO TV REGURGITANT MAX VELOCITY: 2.4 M/S
ECHO TV REGURGITANT PEAK GRADIENT: 23 MMHG

## 2025-08-26 PROCEDURE — 93306 TTE W/DOPPLER COMPLETE: CPT | Performed by: INTERNAL MEDICINE

## 2025-08-26 PROCEDURE — 93306 TTE W/DOPPLER COMPLETE: CPT

## 2025-08-26 PROCEDURE — 93356 MYOCRD STRAIN IMG SPCKL TRCK: CPT | Performed by: INTERNAL MEDICINE

## 2025-08-27 ENCOUNTER — PATIENT MESSAGE (OUTPATIENT)
Dept: CARDIOLOGY CLINIC | Age: 50
End: 2025-08-27

## (undated) DEVICE — SHEET, ORTHO, SPLIT, STERILE: Brand: MEDLINE

## (undated) DEVICE — WAX SURG 2.5GM HEMSTAT BNE BEESWAX PARAFFIN ISO PALMITATE

## (undated) DEVICE — 6619 2 PTNT ISO SYS INCISE AREA&LT;(&GT;&&LT;)&GT;P: Brand: STERI-DRAPE™ IOBAN™ 2

## (undated) DEVICE — ANTERIOR TOTAL HIP: Brand: MEDLINE INDUSTRIES, INC.

## (undated) DEVICE — GLOVE SURG SZ 7 L12IN FNGR THK79MIL GRN LTX FREE

## (undated) DEVICE — MARKER SURG SKIN UTIL BLK REG TIP NONSMEARING W/ 6IN RUL

## (undated) DEVICE — ENDOSCOPIC KIT 2 12 FT OP4 DE2 GWN SYR

## (undated) DEVICE — GLOVE SURG SZ 55 THK91MIL ORANGE  LTX FREE SYN POLYISOPRENE

## (undated) DEVICE — PIN BNE FIX TEMP L140MM DIA4MM MAKO

## (undated) DEVICE — BIPOLAR SEALER 23-112-1 AQM 6.0: Brand: AQUAMANTYS ®

## (undated) DEVICE — SYRINGE MED 30ML STD CLR PLAS LUERLOCK TIP N CTRL DISP

## (undated) DEVICE — TRAP FLUID

## (undated) DEVICE — SOLUTION IRRIG 1000ML H2O PIC PLAS SHATTERPROOF CONTAINER

## (undated) DEVICE — SUTURE MONOCRYL STRATAFIX SPRL + SZ 3-0 L18IN ABSRB UD PS-2 SXMP1B107

## (undated) DEVICE — BLADE,CARBON-STEEL,10,STRL,DISPOSABLE,TB: Brand: MEDLINE

## (undated) DEVICE — SOLUTION IRRIG 3000ML 0.9% SOD CHL ARTHROMATIC PLAS CONT

## (undated) DEVICE — HOLDER SCALP PLAS G STD

## (undated) DEVICE — SUTURE ETHIBOND EXCEL SZ 5 L30IN NONABSORBABLE GRN L40MM V-37 MB66G

## (undated) DEVICE — SUTURE VICRYL SZ 2-0 L18IN ABSRB UD CT-1 L36MM 1/2 CIR J839D

## (undated) DEVICE — 3M™ TEGADERM™ TRANSPARENT FILM DRESSING FRAME STYLE, 1626W, 4 IN X 4-3/4 IN (10 CM X 12 CM), 50/CT 4CT/CASE: Brand: 3M™ TEGADERM™

## (undated) DEVICE — Device: Brand: COVER, PERINEAL POST, 12 PK

## (undated) DEVICE — SPONGE GZ W4XL8IN COT WVN 12 PLY

## (undated) DEVICE — GLOVE ORTHO 7   MSG9470

## (undated) DEVICE — TOWEL,STOP FLAG GOLD N-W: Brand: MEDLINE

## (undated) DEVICE — APPLICATOR MEDICATED 26 CC SOLUTION HI LT ORNG CHLORAPREP

## (undated) DEVICE — SUTURE ETHIBOND EXCEL SZ 2 L30IN NONABSORBABLE GRN L40MM V-37 MX69G

## (undated) DEVICE — ADHESIVE SKIN CLOSURE WND 8.661X1.5 IN 22 CM LIQUIBAND SECUR

## (undated) DEVICE — 450 ML BOTTLE OF 0.05% CHLORHEXIDINE GLUCONATE IN 99.95% STERILE WATER FOR IRRIGATION, USP AND APPLICATOR.: Brand: IRRISEPT ANTIMICROBIAL WOUND LAVAGE

## (undated) DEVICE — DRAPE,UTILTY,TAPE,15X26, 4EA/PK: Brand: MEDLINE

## (undated) DEVICE — AIRLIFE™ NASAL OXYGEN CANNULA CURVED, FLARED TIP, WITH 7 FEET (2.1 M) CRUSH RESISTANT TUBING, OVER-THE-EAR STYLE: Brand: AIRLIFE™

## (undated) DEVICE — KIT DRP FOR RIO ROBOTIC ARM ASST SYS

## (undated) DEVICE — STRIP,CLOSURE,WOUND,MEDI-STRIP,1/2X4: Brand: MEDLINE

## (undated) DEVICE — 3D ISLAND DRESSING 4IN X 8IN: Brand: THERABOND 3D ANTIMICROBIAL BARRIER SYSTEMS

## (undated) DEVICE — GLOVE SURG SZ 6 L12IN FNGR THK79MIL GRN LTX FREE

## (undated) DEVICE — SUTURE ABSORBABLE MONOFILAMENT 1 CTX 36 CM 48 MM VIO PDS +

## (undated) DEVICE — SOLUTION IV 250ML 0.9% SOD CHL FOR EXTRACELLULAR FLD REPL N L8002] B BRAUN MEDICAL INC]

## (undated) DEVICE — 3M™ IOBAN™ 2 ANTIMICROBIAL INCISE DRAPE 6640EZ: Brand: IOBAN™ 2

## (undated) DEVICE — COTTON UNDERCAST PADDING,CRIMPED FINISH: Brand: WEBRIL

## (undated) DEVICE — 3M™ STERI-DRAPE™ U-DRAPE 1015: Brand: STERI-DRAPE™

## (undated) DEVICE — SUTURE PERMA-HAND SZ 2-0 L30IN NONABSORBABLE BLK L26MM SH K833H

## (undated) DEVICE — DUAL CUT SAGITTAL BLADE

## (undated) DEVICE — UNDERPANTS INCONT XL 45-70IN KNIT SEAMLESS DSGN COLOR-CODED

## (undated) DEVICE — KIT TRK HIP PROC VIZADISC

## (undated) DEVICE — GOWN,SURGICAL,AURORA,SLEEVE: Brand: MEDLINE

## (undated) DEVICE — 3M™ IOBAN™ 2 ANTIMICROBIAL INCISE DRAPE 6648EZ: Brand: IOBAN™ 2

## (undated) DEVICE — KIT INT FIX FEM TIB CKPT MAKOPLASTY

## (undated) DEVICE — GOWN,SIRUS,POLYRNF,BRTHSLV,XL,30/CS: Brand: MEDLINE

## (undated) DEVICE — BLADE,CARBON-STEEL,11,STRL,DISPOSABLE,TB: Brand: MEDLINE

## (undated) DEVICE — SOLUTION IV 1000ML 0.9% SOD CHL